# Patient Record
Sex: FEMALE | Race: BLACK OR AFRICAN AMERICAN | Employment: STUDENT | URBAN - METROPOLITAN AREA
[De-identification: names, ages, dates, MRNs, and addresses within clinical notes are randomized per-mention and may not be internally consistent; named-entity substitution may affect disease eponyms.]

---
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
## 2020-10-15 ENCOUNTER — ORDER TRANSCRIPTION (OUTPATIENT)
Dept: PHYSICAL THERAPY | Facility: HOSPITAL | Age: 20
End: 2020-10-15

## 2020-10-15 DIAGNOSIS — G11.8: Primary | ICD-10-CM

## 2020-10-29 ENCOUNTER — TELEPHONE (OUTPATIENT)
Dept: PHYSICAL THERAPY | Age: 20
End: 2020-10-29

## 2020-11-02 ENCOUNTER — OFFICE VISIT (OUTPATIENT)
Dept: PHYSICAL THERAPY | Facility: HOSPITAL | Age: 20
End: 2020-11-02
Attending: Other
Payer: COMMERCIAL

## 2020-11-02 DIAGNOSIS — G11.8: ICD-10-CM

## 2020-11-02 PROCEDURE — 97110 THERAPEUTIC EXERCISES: CPT

## 2020-11-02 PROCEDURE — 97163 PT EVAL HIGH COMPLEX 45 MIN: CPT

## 2020-11-03 NOTE — PROGRESS NOTES
NEUROLOGICAL EVALUATION:   Referring Physician: Dr. Helio Hopkins  Diagnosis: Spinocerebellar ataxia type 7 [facility])     Date of Service: 11/2/2020     PATIENT Juan Velez is a 21year old female who presents to therapy today with complaints of declinin thighs GUS.    Current functional limitations include limited walking with the assist of her mother or father (holds onto their forearms as they walk backwards from her), typically scoots on her bottom or crawls to negotiate within her home otherwise, donna 7. Pt and PT discussed evaluation findings, pathology, POC and HEP. Pt voiced understanding and performs HEP correctly without reported pain. Skilled Physical Therapy is medically necessary to address the above impairments and reach functional goals. for inc time and use of UEs, intermittent parent assist   Stand --> Sit MOD IND-Juan for inc time and use of UEs, intermittent parent assist   Chair --> Chair MOD IND-Juan for inc time and use of UEs, intermittent parent assist     Postural Control:  4-Sta PT POC. 3. Patient will improve 5x STS score to 30 sec or better to demonstrate functional LE strength gain. 4. Patient will improve TUG score to 45 sec or better with LRAD or least assist as able to improve household level ambulation and gait speed.

## 2020-11-04 ENCOUNTER — APPOINTMENT (OUTPATIENT)
Dept: PHYSICAL THERAPY | Facility: HOSPITAL | Age: 20
End: 2020-11-04
Attending: Other
Payer: COMMERCIAL

## 2020-11-09 ENCOUNTER — OFFICE VISIT (OUTPATIENT)
Dept: PHYSICAL THERAPY | Facility: HOSPITAL | Age: 20
End: 2020-11-09
Attending: Other
Payer: COMMERCIAL

## 2020-11-09 PROCEDURE — 97112 NEUROMUSCULAR REEDUCATION: CPT

## 2020-11-09 NOTE — PROGRESS NOTES
Dx: Spinocerebellar ataxia type 7 ([facility] 75.)         Insurance (Authorized # of Visits):  16 requested           Authorizing Physician: Dr. Chaka Feliciano  Next MD visit: none scheduled  Fall Risk: HIGH         Precautions: Fall Risk, visual impairment             [facility] [place] flexion x 10 R/L  - bridges with elbow assist x 10  - fwd/retro walking in // bars, BUEs x 3 laps   - side step x 2 laps, BUEs   - pulse lunge to airex x 10 R/L BUEs        -       -       -       HEP: seated trunk control without UE support, seated GUS hi

## 2020-11-16 ENCOUNTER — OFFICE VISIT (OUTPATIENT)
Dept: PHYSICAL THERAPY | Facility: HOSPITAL | Age: 20
End: 2020-11-16
Attending: Other
Payer: COMMERCIAL

## 2020-11-16 PROCEDURE — 97112 NEUROMUSCULAR REEDUCATION: CPT

## 2020-11-17 NOTE — PROGRESS NOTES
Dx: Spinocerebellar ataxia type 7 ([facility] 75.)         Insurance (Authorized # of Visits):  16 requested           Authorizing Physician: Dr. Pato Mcintosh  Next MD visit: none scheduled  Fall Risk: HIGH         Precautions: Fall Risk, visual impairment             [facility] [place] support, 2# BUE OH lift x 6 x 4  - seated without trunk support, reaching for moving targets x 8  - supine manual resisted LTR, small range x 8  - supine A/AA hip/knee flexion x 10 R/L  - bridges with elbow assist x 10  - fwd/retro walking in // bars, BUEs

## 2020-11-23 ENCOUNTER — OFFICE VISIT (OUTPATIENT)
Dept: PHYSICAL THERAPY | Facility: HOSPITAL | Age: 20
End: 2020-11-23
Attending: Other
Payer: COMMERCIAL

## 2020-11-23 PROCEDURE — 97112 NEUROMUSCULAR REEDUCATION: CPT

## 2020-11-24 NOTE — PROGRESS NOTES
Dx: Spinocerebellar ataxia type 7 ([facility] 75.)         Insurance (Authorized # of Visits):  16 requested           Authorizing Physician: Dr. Maria G Ibanez MD visit: none scheduled  Fall Risk: HIGH         Precautions: Fall Risk, visual impairment             [facility] [place] x 5 min  - seated without trunk support, 2# BUE OH lift x 6 x 4  - seated without trunk support, reaching for moving targets x 8  - supine manual resisted LTR, small range x 8  - supine A/AA hip/knee flexion x 10 R/L  - bridges with elbow assist x 10  - fw

## 2020-11-30 ENCOUNTER — OFFICE VISIT (OUTPATIENT)
Dept: PHYSICAL THERAPY | Facility: HOSPITAL | Age: 20
End: 2020-11-30
Attending: Other
Payer: COMMERCIAL

## 2020-11-30 PROCEDURE — 97112 NEUROMUSCULAR REEDUCATION: CPT

## 2020-12-01 NOTE — PROGRESS NOTES
Dx: Spinocerebellar ataxia type 7 ([facility] 75.)         Insurance (Authorized # of Visits):  16 requested           Authorizing Physician: Dr. Rosalind Dockery  Next MD visit: none scheduled  Fall Risk: HIGH         Precautions: Fall Risk, visual impairment             Ilana Trujillo Date: 11/9/2020  TX#: 2/16 Date: 11/16/2020            TX#: 3/16 Date: 11/23/2020            TX#: 4/16 Date: 11/30/2020           TX#: 5/16 Date:    Tx#: 6/   NR x 45 min  - NuStep L5 x 5 min  - seated without trunk support, 2# BUE OH lift x 6 x 4  - seat

## 2020-12-07 ENCOUNTER — APPOINTMENT (OUTPATIENT)
Dept: PHYSICAL THERAPY | Facility: HOSPITAL | Age: 20
End: 2020-12-07
Attending: Other
Payer: COMMERCIAL

## 2020-12-09 ENCOUNTER — OFFICE VISIT (OUTPATIENT)
Dept: PHYSICAL THERAPY | Facility: HOSPITAL | Age: 20
End: 2020-12-09
Attending: Other
Payer: COMMERCIAL

## 2020-12-09 PROCEDURE — 97112 NEUROMUSCULAR REEDUCATION: CPT

## 2020-12-09 NOTE — PROGRESS NOTES
Dx: Spinocerebellar ataxia type 7 ([facility] 75.)         Insurance (Authorized # of Visits):  16 requested           Authorizing Physician: Dr. Lynda Ibanez MD visit: none scheduled  Fall Risk: HIGH         Precautions: Fall Risk, visual impairment             [facility] [place] maintain progress made in PT. - issued and reviewed    Plan: Continue PT with progression as indicated. Progression standing postural control, fwd/retro/lateral steps to dots.    Date: 11/9/2020  TX#: 2/16 Date: 11/16/2020            TX#: 3/16 Date: 11/23/2 orange spri x 2 laps // bars  - squats to W/C, BUEs x 10 (LEs fatigued on rep 11)  - AP rockerboard, BUFlor x 20   - alt tap to dots on 6\" box x 15 R/L, BUEs   - fwd lunge to philip BUEs x 5 R/L  - DLS balance Juan, multiple bouts (max 60 sec Juan)   - - - -

## 2020-12-16 ENCOUNTER — OFFICE VISIT (OUTPATIENT)
Dept: PHYSICAL THERAPY | Facility: HOSPITAL | Age: 20
End: 2020-12-16
Attending: Other
Payer: COMMERCIAL

## 2020-12-16 PROCEDURE — 97112 NEUROMUSCULAR REEDUCATION: CPT

## 2020-12-17 NOTE — PROGRESS NOTES
Dx: Spinocerebellar ataxia type 7 ([facility] 75.)         Insurance (Authorized # of Visits):  16 requested           Authorizing Physician: Dr. Krystyna Fischer  Next MD visit: none scheduled  Fall Risk: HIGH         Precautions: Fall Risk, visual impairment             Araceli Luu gait speed. - progress  5. Patient will be IND and compliant in HEP to maintain progress made in PT. - issued and reviewed    Plan: Continue PT with progression as indicated. Progression standing postural control, fwd/retro/lateral steps to dots.    Date: 1 min  - NuStep L5 x 5 min  - fwd/retro walking with orange spri x 2 laps // bars  - side stepping orange spri x 2 laps // bars  - squats to W/C, BUEs x 10 (LEs fatigued on rep 11)  - AP rockerboard, Hu x 20   - alt tap to dots on 6\" box x 15 R/L, Hu

## 2020-12-21 ENCOUNTER — OFFICE VISIT (OUTPATIENT)
Dept: PHYSICAL THERAPY | Facility: HOSPITAL | Age: 20
End: 2020-12-21
Attending: Other
Payer: COMMERCIAL

## 2020-12-21 PROCEDURE — 97112 NEUROMUSCULAR REEDUCATION: CPT

## 2020-12-22 NOTE — PROGRESS NOTES
Dx: Spinocerebellar ataxia type 7 ([facility] 75.)         Insurance (Authorized # of Visits):  16 requested           Authorizing Physician: Dr. Yokasta Dao  Next MD visit: none scheduled  Fall Risk: HIGH         Precautions: Fall Risk, visual impairment             [place] assist as able to improve household level ambulation and gait speed. - progress  5. Patient will be IND and compliant in HEP to maintain progress made in PT. - issued and reviewed    Plan: Continue PT with progression as indicated.  Progression standing pos lunge to airex x 5 R/L  - DLS balance Juan, multiple bouts (max 60 sec Juan) NR x 45 min  - NuStep L5 x 5 min  - fwd/retro walking with orange spri x 2 laps // bars  - side stepping orange spri x 2 laps // bars  - squats to W/C, BUEs x 10 (LEs fatigued on

## 2020-12-28 PROBLEM — F43.29 ADJUSTMENT DISORDER WITH OTHER SYMPTOM: Status: ACTIVE | Noted: 2020-12-28

## 2020-12-30 ENCOUNTER — OFFICE VISIT (OUTPATIENT)
Dept: PHYSICAL THERAPY | Facility: HOSPITAL | Age: 20
End: 2020-12-30
Attending: Other
Payer: COMMERCIAL

## 2020-12-30 PROCEDURE — 97112 NEUROMUSCULAR REEDUCATION: CPT

## 2020-12-30 NOTE — PROGRESS NOTES
Dx: Spinocerebellar ataxia type 7 ([facility] 75.)         Insurance (Authorized # of Visits):  16 requested           Authorizing Physician: Dr. Arnaldo Berry  Next MD visit: none scheduled  Fall Risk: HIGH         Precautions: Fall Risk, visual impairment             [facility] [place] improve postural control by performing normal ELSY 30 sec or better. - progress  2. Patient will demonstrate ability to propel her lightweight W/C over level surfaces without assist if she is able to be fit for one during duration of PT POC.    3. Patient wi mahi x 10 R/L, BUEs  - AP rockerboard, BUEs x 20   - 6\" fwd step ups, BUEs x 10 R/L   - DLS balance Juan, multiple bouts    NR x 45 min  - NuStep L5 x 5 min  - fwd/retro walking in // bars, BUEs x 3 laps   - side stepping orange spri x 2 laps // bars  - - - - - -   HEP: seated trunk control without UE support, seated GUS hip abd with KAROLYN AYALA    Charges: NR x 3       Total Timed Treatment: 45 min  Total Treatment Time: 45 min

## 2021-01-06 ENCOUNTER — OFFICE VISIT (OUTPATIENT)
Dept: PHYSICAL THERAPY | Facility: HOSPITAL | Age: 21
End: 2021-01-06
Attending: Other
Payer: COMMERCIAL

## 2021-01-06 PROCEDURE — 97112 NEUROMUSCULAR REEDUCATION: CPT

## 2021-01-07 NOTE — PROGRESS NOTES
Progress Summary  Pt has attended 10 visits in Physical Therapy.      Dx: Spinocerebellar ataxia type 7 ([facility] 75.)         Insurance (Authorized # of Visits):  16 requested           Authorizing Physician: Dr. Kyle Calvin  Next MD visit: none scheduled  Fall Risk: HI in order to begin learning how to self-propel her WC when she receives a lightweight in the future, however, this process has not yet been started.  Patient with improving activity tolerance with ability to complete 45 min session with one seated rest break need for these services furnished under this plan of treatment and while under my care.     X___________________________________________________ Date____________________    Certification From: 3/5/6347  To:4/6/2021     Date: 11/16/2020            TX#: 3/16 min  - bosu fwd pulse lunges x 10 R/L, BUEs  - sand dune fwd step ups x 8 R/L, BUEs   - side stepping orange spri x 2 laps // bars  - squats to W/C, BUEs x 10 (LEs fatigued on rep 11)  - AP rockerboard, BUEs x 20   - DLS balance Juan, multiple bouts (max 6

## 2021-01-11 ENCOUNTER — APPOINTMENT (OUTPATIENT)
Dept: PHYSICAL THERAPY | Facility: HOSPITAL | Age: 21
End: 2021-01-11
Attending: Other
Payer: COMMERCIAL

## 2021-01-20 ENCOUNTER — APPOINTMENT (OUTPATIENT)
Dept: PHYSICAL THERAPY | Facility: HOSPITAL | Age: 21
End: 2021-01-20
Attending: Other
Payer: COMMERCIAL

## 2021-01-20 PROCEDURE — 97112 NEUROMUSCULAR REEDUCATION: CPT

## 2021-01-21 NOTE — PROGRESS NOTES
Dx: Spinocerebellar ataxia type 7 ([facility] 75.)         Insurance (Authorized # of Visits):  20 requested           Authorizing Physician: Dr. Lynda Ibanez MD visit: none scheduled  Fall Risk: HIGH         Precautions: Fall Risk, visual impairment             Connie Anderson demonstrate functional LE strength gain. - progress  4. Patient will improve TUG score to 45 sec or better with LRAD or least assist as able to improve household level ambulation and gait speed. - progress  5.  Patient will be IND and compliant in HEP to ma rockerboard, BUEs x 20   - DLS balance Juan, multiple bouts (max 60 sec Juan) NR x 45 min  - NuStep L6 x 5 min  - AP rockerboard x 20  - sand dune fwd step ups x 8 R/L, BUEs   - side stepping orange spri x 2 laps // bars  - fwd/retro walking with orange sp

## 2021-01-27 ENCOUNTER — OFFICE VISIT (OUTPATIENT)
Dept: PHYSICAL THERAPY | Facility: HOSPITAL | Age: 21
End: 2021-01-27
Attending: Other
Payer: COMMERCIAL

## 2021-01-27 PROCEDURE — 97112 NEUROMUSCULAR REEDUCATION: CPT

## 2021-01-28 NOTE — PROGRESS NOTES
Dx: Spinocerebellar ataxia type 7 ([facility] 75.)         Insurance (Authorized # of Visits):  20 requested           Authorizing Physician: Dr. Josr Peterson  Next MD visit: none scheduled  Fall Risk: HIGH         Precautions: Fall Risk, visual impairment             [facility] [place] better with LRAD or least assist as able to improve household level ambulation and gait speed. - progress  5.  Patient will be IND and compliant in HEP to maintain progress made in PT. - issued and reviewed      Plan: Continue PT with progression as indicat airex, unsuccessful) NR x 45 min  - NuStep L6 x 5 min  - STS with bar in front, BUEs x 12, focus on slow control with sit  - sand dune fwd step ups x 8 R/L, BUEs  - side stepping with 3# ankle wts x 2 laps, BUEs    - standing marching with 3# ankle wts, BU

## 2021-02-02 ENCOUNTER — OFFICE VISIT (OUTPATIENT)
Dept: PHYSICAL THERAPY | Facility: HOSPITAL | Age: 21
End: 2021-02-02
Attending: Other
Payer: COMMERCIAL

## 2021-02-02 PROCEDURE — 97112 NEUROMUSCULAR REEDUCATION: CPT

## 2021-02-02 NOTE — PROGRESS NOTES
Dx: Spinocerebellar ataxia type 7 ([facility] 75.)         Insurance (Authorized # of Visits):  20 requested           Authorizing Physician: Dr. Kyle Calvin  Next MD visit: none scheduled  Fall Risk: HIGH         Precautions: Fall Risk, visual impairment             [facility] [place] strength gain. - progress  4. Patient will improve TUG score to 45 sec or better with LRAD or least assist as able to improve household level ambulation and gait speed. - progress  5.  Patient will be IND and compliant in HEP to maintain progress made in PT with wide ELSY, 3# ankle wts, CGA-light Juan, 60 sec x 3  - fwd/retro walking with inc step length, BUEs  X 2 laps // bars  NR x 45 min  - NuStep L6 x 5 min  - goals reassessment as needed and listed   - yellow spri side step x 2 laps // bars  - yellow spri

## 2021-02-09 ENCOUNTER — APPOINTMENT (OUTPATIENT)
Dept: PHYSICAL THERAPY | Facility: HOSPITAL | Age: 21
End: 2021-02-09
Attending: Other
Payer: COMMERCIAL

## 2021-02-10 ENCOUNTER — APPOINTMENT (OUTPATIENT)
Dept: PHYSICAL THERAPY | Facility: HOSPITAL | Age: 21
End: 2021-02-10
Attending: Other
Payer: COMMERCIAL

## 2021-02-10 ENCOUNTER — TELEPHONE (OUTPATIENT)
Dept: PHYSICAL THERAPY | Facility: HOSPITAL | Age: 21
End: 2021-02-10

## 2021-02-16 ENCOUNTER — OFFICE VISIT (OUTPATIENT)
Dept: PHYSICAL THERAPY | Facility: HOSPITAL | Age: 21
End: 2021-02-16
Attending: Other
Payer: COMMERCIAL

## 2021-02-16 PROCEDURE — 97112 NEUROMUSCULAR REEDUCATION: CPT

## 2021-02-16 NOTE — PROGRESS NOTES
Dx: Spinocerebellar ataxia type 7 ([facility] 75.)         Insurance (Authorized # of Visits):  20 requested           Authorizing Physician: Dr. Lynda Ibanez MD visit: none scheduled  Fall Risk: HIGH         Precautions: Fall Risk, visual impairment             Connie Anderson improve 5x STS score to 30 sec or better to demonstrate functional LE strength gain. - progress  4. Patient will improve TUG score to 45 sec or better with LRAD or least assist as able to improve household level ambulation and gait speed. - progress  5.  Pa // bars  - attempted sand dune step ups, too hard; airex beam step ups x 15 R/L BUEs   - squats to chair, hold x 3 sec, BUEs x 12   - standing marching with 3# ankle wts, BUE assist x 12 R/L  - bosu fwd pulse lunges, BUEs x 10 R/L  - goals reassessment as [address]    Charges: NR x 3       Total Timed Treatment: 45 min  Total Treatment Time: 45 min

## 2021-02-23 ENCOUNTER — OFFICE VISIT (OUTPATIENT)
Dept: PHYSICAL THERAPY | Facility: HOSPITAL | Age: 21
End: 2021-02-23
Attending: Other
Payer: COMMERCIAL

## 2021-02-23 PROCEDURE — 97112 NEUROMUSCULAR REEDUCATION: CPT

## 2021-02-23 NOTE — PROGRESS NOTES
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 Whitney Carlos  Next MD visit: none scheduled  Fall Risk: HIGH         Precautions: Fall Risk, visual impairment             Keyonna Martins functional LE strength gain. - progress  4. Patient will improve TUG score to 45 sec or better with LRAD or least assist as able to improve household level ambulation and gait speed. - progress  5.  Patient will be IND and compliant in HEP to maintain progr - side stepping Y spri x 2 laps (fatigued)  - sand dune step ups, BUEs x 12 R/L   - bosu lunges x 10 R/L, BUEs   - squats to chair, hold x 3 sec, BUEs x 12   - DLS on level surface with wide ELSY, 3# ankle wts, CGA-light Juan, 60 sec x 3  - DLS on level r -   - - - - - - - -   HEP: seated trunk control without UE support, seated GUS hip abd with KAROLYN AYALA    Charges: NR x 3       Total Timed Treatment: 45 min  Total Treatment Time: 45 min

## 2021-03-01 ENCOUNTER — TELEPHONE (OUTPATIENT)
Dept: PHYSICAL THERAPY | Facility: HOSPITAL | Age: 21
End: 2021-03-01

## 2021-03-05 ENCOUNTER — OFFICE VISIT (OUTPATIENT)
Dept: PHYSICAL THERAPY | Facility: HOSPITAL | Age: 21
End: 2021-03-05
Attending: Other
Payer: COMMERCIAL

## 2021-03-05 PROCEDURE — 97112 NEUROMUSCULAR REEDUCATION: CPT

## 2021-03-05 NOTE — PROGRESS NOTES
Dx: Spinocerebellar ataxia type 7 ([facility] 75.)         Insurance (Authorized # of Visits):  20 requested           Authorizing Physician: Dr. Nancy Brooks  Next MD visit: none scheduled  Fall Risk: HIGH         Precautions: Fall Risk, visual impairment             [facility] [place] is able to be fit for one during duration of PT POC. - to begin formally addressing next session  3. Patient will improve 5x STS score to 30 sec or better to demonstrate functional LE strength gain. - progress  4.  Patient will improve TUG score to 45 sec o moving targets, 1UE x 10 R/L NR x 45 min  - NuStep L6 x 5 min  - fwd/retro walking in // bars, BUEs x 3 laps   - side stepping Y spri x 2 laps (fatigued)  - sand dune step ups, BUEs x 12 R/L   - bosu lunges x 10 R/L, BUEs   - squats to chair, hold x 3 sec, taps to targets x 12 R/L   - squats to chair with BUEs x 10, x 5  - sand dune step ups, BUEs x 12 R/L  - DLS balance, multiple attempts with min-modA   - - - - - - - -   - - - - - - - -   HEP: seated trunk control without UE support, seated GUS hip abd wit

## 2021-03-12 ENCOUNTER — OFFICE VISIT (OUTPATIENT)
Dept: PHYSICAL THERAPY | Facility: HOSPITAL | Age: 21
End: 2021-03-12
Attending: Other
Payer: COMMERCIAL

## 2021-03-12 PROCEDURE — 97112 NEUROMUSCULAR REEDUCATION: CPT

## 2021-03-12 NOTE — PROGRESS NOTES
Dx: Spinocerebellar ataxia type 7 ([facility] 75.)         Insurance (Authorized # of Visits):  20 requested           Authorizing Physician: Dr. Rod Gray  Next MD visit: none scheduled  Fall Risk: HIGH         Precautions: Fall Risk, visual impairment             [facility] [place] as needed throughout. Goals: (To be met in 20 visits)  1. Patient will improve postural control by performing normal ELSY 30 sec or better. - progress  2.  Patient will demonstrate ability to propel her lightweight W/C over level surfaces without dorcas (fatigued)  - sand dune step ups, BUEs x 12 R/L   - bosu lunges x 10 R/L, BUEs   - squats to chair, hold x 3 sec, BUEs x 12   - DLS on level surface with wide ELSY, 3# ankle wts, CGA-light Juan, 60 sec x 3  - DLS on level reaching for moving targets, 1UE x min-modA NR x 45 min  - NuStep L7 x 5 min  - fwd/retro walking in // bars with BUEs x 3 laps  - side stepping with Y spri in // bars, foucs on step length x 2 laps // bars  - fwd walking over hurdles (4), BUEs x 6  - sand dune step ups, BUEs x 12 R/L  - ma

## 2021-03-19 ENCOUNTER — OFFICE VISIT (OUTPATIENT)
Dept: PHYSICAL THERAPY | Facility: HOSPITAL | Age: 21
End: 2021-03-19
Attending: Other
Payer: COMMERCIAL

## 2021-03-19 PROCEDURE — 97112 NEUROMUSCULAR REEDUCATION: CPT

## 2021-03-19 NOTE — PROGRESS NOTES
Dx: Spinocerebellar ataxia type 7 ([facility] 75.)         Insurance (Authorized # of Visits):  20 requested           Authorizing Physician: Dr. Nancy Brooks  Next MD visit: none scheduled  Fall Risk: HIGH         Precautions: Fall Risk, visual impairment             [facility] [place] - progress  4. Patient will improve TUG score to 45 sec or better with LRAD or least assist as able to improve household level ambulation and gait speed. - progress  5.  Patient will be IND and compliant in HEP to maintain progress made in PT. - issued and seated EOM, no UE support tapping moving targets x ~20  - walking fwd/retro // bars, BUEs x 2 laps ea  - side step ups to 6\" box with CGA, BUEs x 8 R/L   - AP rockerboard x 25  - DLS wide ELSY, CGA-Juan 2 x 60 sec  NR x 45 min  - NuStep L7 x 5 min  - walki seated trunk control without UE support, seated GUS hip abd with KAROLYN AYALA    Charges: NR x 3       Total Timed Treatment: 45 min  Total Treatment Time: 45 min

## 2021-03-26 ENCOUNTER — OFFICE VISIT (OUTPATIENT)
Dept: PHYSICAL THERAPY | Facility: HOSPITAL | Age: 21
End: 2021-03-26
Attending: Other
Payer: COMMERCIAL

## 2021-03-26 PROCEDURE — 97112 NEUROMUSCULAR REEDUCATION: CPT

## 2021-03-26 NOTE — PROGRESS NOTES
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 Nancy Brooks  Next MD visit: none scheduled  Fall Risk: HIGH         Precautions: Fall Risk, visual impairment             [facility] [place] demonstrate functional LE strength gain. - progress  4. Patient will improve TUG score to 45 sec or better with LRAD or least assist as able to improve household level ambulation and gait speed. - progress  5.  Patient will be IND and compliant in HEP to ma 10 R/L, BUEs   - bosu fwd step ups x ~6 total min-modA   - seated without back support, attempt at keeping head in neutral, BUE press OH with 2# ball x 15  - seated without back support, Y theratube rows 2 x 15  - fwd walking over hurdles (3) with BUEs x 4 wide ELSY, attempting to reach for moving targets, difficult, multiple attempts   - - - - - - - -   - - - - - - - -   HEP: seated trunk control without UE support, seated GUS hip abd with KAROLYN AYALA    Charges: NR x 3       Total Timed Treatment: 45 min  Tot

## 2021-04-01 ENCOUNTER — APPOINTMENT (OUTPATIENT)
Dept: PHYSICAL THERAPY | Facility: HOSPITAL | Age: 21
End: 2021-04-01
Attending: Other
Payer: COMMERCIAL

## 2021-04-02 ENCOUNTER — APPOINTMENT (OUTPATIENT)
Dept: PHYSICAL THERAPY | Facility: HOSPITAL | Age: 21
End: 2021-04-02
Attending: Other
Payer: COMMERCIAL

## 2021-04-02 PROCEDURE — 97112 NEUROMUSCULAR REEDUCATION: CPT

## 2021-04-02 NOTE — PROGRESS NOTES
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 Baker ref.  provider found  Next MD visit: none scheduled  Fall Risk: HIGH         Precautions: Fall Risk, visual impairment addressing next session  3. Patient will improve 5x STS score to 30 sec or better to demonstrate functional LE strength gain. - progress  4.  Patient will improve TUG score to 45 sec or better with LRAD or least assist as able to improve household level amb walking in // bars with BUEs x 3 laps  - side stepping with Y spri in // bars, foucs on step length x 2 laps // bars  - seated without back support, attempt at keeping head in neutral, BUE press OH with 4# ball x 15  - marching with knee taps to targets x R/L   - squats to chair with BUEs 2 x 10  - DLS balance, wide ELSY, attempting to reach for moving targets, difficult, multiple attempts   - - - - - - - -   - - - - - - - -   HEP: seated trunk control without UE support, seated GUS hip abd with KAROLYN AYALA

## 2021-04-09 ENCOUNTER — OFFICE VISIT (OUTPATIENT)
Dept: PHYSICAL THERAPY | Facility: HOSPITAL | Age: 21
End: 2021-04-09
Attending: Other
Payer: COMMERCIAL

## 2021-04-09 PROCEDURE — 97112 NEUROMUSCULAR REEDUCATION: CPT

## 2021-04-09 NOTE — PROGRESS NOTES
Progress Summary  Pt has attended 20 visits in Physical Therapy.      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 Rina Ortega  Next MD visit: none scheduled  Fall Risk: HI rest breaks needed. She is more limited in the amount she is able to accomplish in session by duration of time needed to complete each movement/slowed speed due to limited postural control and increased inappropriate feedback.  She demonstrates functional s instruction             Patient/Family/Caregiver was advised of these findings, precautions, and treatment options and has agreed to actively participate in planning and for this course of care.     Thank you for your referral. If you have any questions, pl support, attempt at keeping head in neutral, BUE press OH with 4# ball x 15  - marching with knee taps to targets x 12 R/L   - squats to chair with BUEs x 10, x 5  - sand dune step ups, BUEs x 12 R/L  - DLS balance, multiple attempts with min-modA NR x 45 45 min  - NuStep L7 x 5 min  - fwd walking in // bars x 2 laps   - fwd walking over obstacle course in // bars including hurdles, airex, and 6\" box x 6 lengths  - standing alt toe tap to 6\" step with best speed and BUEs, large dot x 2 min, with 3# GUS an

## 2021-04-16 ENCOUNTER — OFFICE VISIT (OUTPATIENT)
Dept: PHYSICAL THERAPY | Facility: HOSPITAL | Age: 21
End: 2021-04-16
Attending: Other
Payer: COMMERCIAL

## 2021-04-16 PROCEDURE — 97112 NEUROMUSCULAR REEDUCATION: CPT

## 2021-04-16 NOTE — PROGRESS NOTES
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 Connolly He  Next MD visit: none scheduled  Fall Risk: HIGH         Precautions: Fall Risk, visual impairment             [facility] PT POC. - progress, patient does not yet have lightweight WC  3. Patient will improve 5x STS score to 30 sec or better to demonstrate functional LE strength gain. - MET  4.  Patient will improve TUG score to 45 sec or better with LRAD or least assist as abl dune step ups, BUEs x 12 R/L  - marching on airex to targets, BUEs x 10 R/L  - squats to chair with BUEs x 10, x 5  - DLS balance, multiple attempts with min-modA    NR x 45 min  - NuStep L7 x 5 min  - fwd/retro walking in // bars with BUEs x 3 laps  - raven difficult, multiple attempts NR x 45 min  - NuStep L7 x 5 min  - fwd walking in // bars with 2# ankle wts x 2 laps, BUEs  - side stepping in // bars with 2# ankle wts x 2 laps, BUEs  - standing alt toe tap to 6\" step with best speed, BUEs and 2# ankle wts

## 2021-04-23 ENCOUNTER — OFFICE VISIT (OUTPATIENT)
Dept: PHYSICAL THERAPY | Facility: HOSPITAL | Age: 21
End: 2021-04-23
Attending: Other
Payer: COMMERCIAL

## 2021-04-23 PROCEDURE — 97112 NEUROMUSCULAR REEDUCATION: CPT

## 2021-04-23 NOTE — PROGRESS NOTES
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 Arnaldo Berry  Next MD visit: none scheduled  Fall Risk: HIGH         Precautions: Fall Risk, visual impairment             [facility] [place] LRAD or least assist as able to improve household level ambulation and gait speed. - discharge this goal  5. Patient will demonstrate ability to sit without UE assist >60 sec with cervical neutral - new goal, progress  5.  Patient will be IND and compliant (3), BUEs x 3 laps   - standing alt toe tap to 6\" step with best speed and BUEs, large dot x 60 sec   - AP rockerboard, focus on posturing x ~20 (hard, but enjoyable)  - squats to chair with BUEs x 10, x 5  - DLS balance, wide ELSY, attempting to reach for walking over 6\" step, 8\" step, airex BUEs with 2# ankle wts x 2 lengths  - standing alt toe tap to 6\" step with best speed, BUEs and 2# ankle wts x 3 min  - squats to W/C with BUEs and 3 sec hold 2 x 10   - - - - - - - -   - - - - - - - -   HEP: seated

## 2021-04-30 ENCOUNTER — OFFICE VISIT (OUTPATIENT)
Dept: PHYSICAL THERAPY | Facility: HOSPITAL | Age: 21
End: 2021-04-30
Attending: Other
Payer: COMMERCIAL

## 2021-04-30 PROCEDURE — 97112 NEUROMUSCULAR REEDUCATION: CPT

## 2021-04-30 NOTE — PROGRESS NOTES
Discharge Summary  Pt has attended 23 visits in Physical Therapy.      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 Pato Mcintosh  Next MD visit: none scheduled  Fall Risk: H program with help from her family. Discussed home options for continued increased mobility. She was wondering about home // bars, given items to search online and she will do so with family.  Also spoke more regarding exercises that will assist with her nannette Patient/Family/Caregiver was advised of these findings, precautions, and treatment options and has agreed to actively participate in planning and for this course of care.     Thank you for your referral. If you have any questions, please contact me at D x 3 laps   - fwd walking over obstacle course in // bars including hurdles, airex, and 6\" box x 6 lengths  - standing alt toe tap to 6\" step with best speed and BUEs, large dot x 2 min, with 3# GUS ankle wts x 2 min  - standing alternating hip abd, 3# BI 10\") x 2 BUEs   - standing alt toe tap to 8\" step with best speed, BUEs and 4# ankle wts x 3 min  - standing lateral SLS toe tap, 4#, BUEs x 2 min R/L  - DLS balance widened ELSY with alt UE reaches 2 x 5 R/L, min-modA  - DLS balance widened ELSY with R/L

## 2021-07-07 PROBLEM — F43.9 STRESS: Status: ACTIVE | Noted: 2021-07-07

## 2022-12-29 ENCOUNTER — APPOINTMENT (OUTPATIENT)
Dept: GENERAL RADIOLOGY | Facility: HOSPITAL | Age: 22
End: 2022-12-29
Attending: UROLOGY
Payer: COMMERCIAL

## 2022-12-29 ENCOUNTER — ANESTHESIA (OUTPATIENT)
Dept: SURGERY | Facility: HOSPITAL | Age: 22
End: 2022-12-29
Payer: COMMERCIAL

## 2022-12-29 ENCOUNTER — HOSPITAL ENCOUNTER (INPATIENT)
Facility: HOSPITAL | Age: 22
LOS: 3 days | Discharge: HOME HEALTH | End: 2023-01-01
Attending: EMERGENCY MEDICINE | Admitting: INTERNAL MEDICINE
Payer: COMMERCIAL

## 2022-12-29 ENCOUNTER — APPOINTMENT (OUTPATIENT)
Dept: CT IMAGING | Age: 22
End: 2022-12-29
Attending: EMERGENCY MEDICINE
Payer: COMMERCIAL

## 2022-12-29 ENCOUNTER — ANESTHESIA EVENT (OUTPATIENT)
Dept: SURGERY | Facility: HOSPITAL | Age: 22
End: 2022-12-29
Payer: COMMERCIAL

## 2022-12-29 DIAGNOSIS — D72.829 LEUKOCYTOSIS, UNSPECIFIED TYPE: Primary | ICD-10-CM

## 2022-12-29 DIAGNOSIS — N13.30 HYDRONEPHROSIS, RIGHT: ICD-10-CM

## 2022-12-29 DIAGNOSIS — R00.0 SINUS TACHYCARDIA: ICD-10-CM

## 2022-12-29 LAB
ALBUMIN SERPL-MCNC: 4.3 G/DL (ref 3.4–5)
ALBUMIN/GLOB SERPL: 0.9 {RATIO} (ref 1–2)
ALP LIVER SERPL-CCNC: 71 U/L
ALT SERPL-CCNC: 22 U/L
ANION GAP SERPL CALC-SCNC: 6 MMOL/L (ref 0–18)
AST SERPL-CCNC: 22 U/L (ref 15–37)
B-HCG UR QL: NEGATIVE
BASOPHILS # BLD AUTO: 0.02 X10(3) UL (ref 0–0.2)
BASOPHILS NFR BLD AUTO: 0.1 %
BILIRUB SERPL-MCNC: 0.4 MG/DL (ref 0.1–2)
BILIRUB UR QL STRIP.AUTO: NEGATIVE
BUN BLD-MCNC: 11 MG/DL (ref 7–18)
CALCIUM BLD-MCNC: 10 MG/DL (ref 8.5–10.1)
CHLORIDE SERPL-SCNC: 102 MMOL/L (ref 98–112)
CO2 SERPL-SCNC: 27 MMOL/L (ref 21–32)
COLOR UR AUTO: YELLOW
CREAT BLD-MCNC: 0.8 MG/DL
EOSINOPHIL # BLD AUTO: 0.01 X10(3) UL (ref 0–0.7)
EOSINOPHIL NFR BLD AUTO: 0.1 %
ERYTHROCYTE [DISTWIDTH] IN BLOOD BY AUTOMATED COUNT: 11.9 %
GFR SERPLBLD BASED ON 1.73 SQ M-ARVRAT: 107 ML/MIN/1.73M2 (ref 60–?)
GLOBULIN PLAS-MCNC: 4.8 G/DL (ref 2.8–4.4)
GLUCOSE BLD-MCNC: 132 MG/DL (ref 70–99)
GLUCOSE UR STRIP.AUTO-MCNC: 500 MG/DL
HCG SERPL QL: NEGATIVE
HCT VFR BLD AUTO: 40.2 %
HGB BLD-MCNC: 14.3 G/DL
IMM GRANULOCYTES # BLD AUTO: 0.06 X10(3) UL (ref 0–1)
IMM GRANULOCYTES NFR BLD: 0.4 %
KETONES UR STRIP.AUTO-MCNC: NEGATIVE MG/DL
LACTATE SERPL-SCNC: 1 MMOL/L (ref 0.4–2)
LEUKOCYTE ESTERASE UR QL STRIP.AUTO: NEGATIVE
LIPASE SERPL-CCNC: 91 U/L (ref 73–393)
LYMPHOCYTES # BLD AUTO: 0.66 X10(3) UL (ref 1–4)
LYMPHOCYTES NFR BLD AUTO: 3.9 %
MCH RBC QN AUTO: 32.9 PG (ref 26–34)
MCHC RBC AUTO-ENTMCNC: 35.6 G/DL (ref 31–37)
MCV RBC AUTO: 92.4 FL
MONOCYTES # BLD AUTO: 0.57 X10(3) UL (ref 0.1–1)
MONOCYTES NFR BLD AUTO: 3.4 %
NEUTROPHILS # BLD AUTO: 15.54 X10 (3) UL (ref 1.5–7.7)
NEUTROPHILS # BLD AUTO: 15.54 X10(3) UL (ref 1.5–7.7)
NEUTROPHILS NFR BLD AUTO: 92.1 %
NITRITE UR QL STRIP.AUTO: NEGATIVE
OSMOLALITY SERPL CALC.SUM OF ELEC: 281 MOSM/KG (ref 275–295)
PH UR STRIP.AUTO: 7 [PH] (ref 5–8)
PLATELET # BLD AUTO: 291 10(3)UL (ref 150–450)
POCT INFLUENZA A: NEGATIVE
POCT INFLUENZA B: NEGATIVE
POTASSIUM SERPL-SCNC: 3.8 MMOL/L (ref 3.5–5.1)
PROT SERPL-MCNC: 9.1 G/DL (ref 6.4–8.2)
RBC # BLD AUTO: 4.35 X10(6)UL
RBC UR QL AUTO: NEGATIVE
SARS-COV-2 RNA RESP QL NAA+PROBE: NOT DETECTED
SODIUM SERPL-SCNC: 135 MMOL/L (ref 136–145)
SP GR UR STRIP.AUTO: 1.02 (ref 1–1.03)
UROBILINOGEN UR STRIP.AUTO-MCNC: 0.2 MG/DL
WBC # BLD AUTO: 16.9 X10(3) UL (ref 4–11)

## 2022-12-29 PROCEDURE — 80053 COMPREHEN METABOLIC PANEL: CPT | Performed by: EMERGENCY MEDICINE

## 2022-12-29 PROCEDURE — 0T768DZ DILATION OF RIGHT URETER WITH INTRALUMINAL DEVICE, VIA NATURAL OR ARTIFICIAL OPENING ENDOSCOPIC: ICD-10-PCS | Performed by: UROLOGY

## 2022-12-29 PROCEDURE — 96375 TX/PRO/DX INJ NEW DRUG ADDON: CPT

## 2022-12-29 PROCEDURE — 84703 CHORIONIC GONADOTROPIN ASSAY: CPT | Performed by: EMERGENCY MEDICINE

## 2022-12-29 PROCEDURE — 99285 EMERGENCY DEPT VISIT HI MDM: CPT

## 2022-12-29 PROCEDURE — 96361 HYDRATE IV INFUSION ADD-ON: CPT

## 2022-12-29 PROCEDURE — 85025 COMPLETE CBC W/AUTO DIFF WBC: CPT | Performed by: EMERGENCY MEDICINE

## 2022-12-29 PROCEDURE — 74177 CT ABD & PELVIS W/CONTRAST: CPT | Performed by: EMERGENCY MEDICINE

## 2022-12-29 PROCEDURE — 87086 URINE CULTURE/COLONY COUNT: CPT | Performed by: PHYSICIAN ASSISTANT

## 2022-12-29 PROCEDURE — 93005 ELECTROCARDIOGRAM TRACING: CPT

## 2022-12-29 PROCEDURE — 96374 THER/PROPH/DIAG INJ IV PUSH: CPT

## 2022-12-29 PROCEDURE — 81015 MICROSCOPIC EXAM OF URINE: CPT | Performed by: EMERGENCY MEDICINE

## 2022-12-29 PROCEDURE — 81001 URINALYSIS AUTO W/SCOPE: CPT | Performed by: EMERGENCY MEDICINE

## 2022-12-29 PROCEDURE — 93010 ELECTROCARDIOGRAM REPORT: CPT

## 2022-12-29 PROCEDURE — BT1DZZZ FLUOROSCOPY OF RIGHT KIDNEY, URETER AND BLADDER: ICD-10-PCS | Performed by: UROLOGY

## 2022-12-29 PROCEDURE — 87502 INFLUENZA DNA AMP PROBE: CPT | Performed by: EMERGENCY MEDICINE

## 2022-12-29 PROCEDURE — 83690 ASSAY OF LIPASE: CPT | Performed by: EMERGENCY MEDICINE

## 2022-12-29 PROCEDURE — 87040 BLOOD CULTURE FOR BACTERIA: CPT | Performed by: EMERGENCY MEDICINE

## 2022-12-29 PROCEDURE — 81025 URINE PREGNANCY TEST: CPT

## 2022-12-29 PROCEDURE — 83605 ASSAY OF LACTIC ACID: CPT | Performed by: EMERGENCY MEDICINE

## 2022-12-29 DEVICE — URETERAL STENT
Type: IMPLANTABLE DEVICE | Site: URETER | Status: FUNCTIONAL
Brand: ASCERTA™

## 2022-12-29 RX ORDER — SODIUM CHLORIDE, SODIUM LACTATE, POTASSIUM CHLORIDE, CALCIUM CHLORIDE 600; 310; 30; 20 MG/100ML; MG/100ML; MG/100ML; MG/100ML
INJECTION, SOLUTION INTRAVENOUS CONTINUOUS PRN
Status: DISCONTINUED | OUTPATIENT
Start: 2022-12-29 | End: 2022-12-29 | Stop reason: SURG

## 2022-12-29 RX ORDER — LIDOCAINE HYDROCHLORIDE 10 MG/ML
INJECTION, SOLUTION EPIDURAL; INFILTRATION; INTRACAUDAL; PERINEURAL AS NEEDED
Status: DISCONTINUED | OUTPATIENT
Start: 2022-12-29 | End: 2022-12-29 | Stop reason: SURG

## 2022-12-29 RX ORDER — HYDROMORPHONE HYDROCHLORIDE 1 MG/ML
0.4 INJECTION, SOLUTION INTRAMUSCULAR; INTRAVENOUS; SUBCUTANEOUS EVERY 5 MIN PRN
Status: DISCONTINUED | OUTPATIENT
Start: 2022-12-29 | End: 2022-12-29 | Stop reason: HOSPADM

## 2022-12-29 RX ORDER — ACETAMINOPHEN 500 MG
1000 TABLET ORAL ONCE
Status: DISCONTINUED | OUTPATIENT
Start: 2022-12-29 | End: 2023-01-01

## 2022-12-29 RX ORDER — ONDANSETRON 2 MG/ML
4 INJECTION INTRAMUSCULAR; INTRAVENOUS EVERY 6 HOURS PRN
Status: DISCONTINUED | OUTPATIENT
Start: 2022-12-29 | End: 2022-12-29 | Stop reason: HOSPADM

## 2022-12-29 RX ORDER — HYDROMORPHONE HYDROCHLORIDE 1 MG/ML
0.2 INJECTION, SOLUTION INTRAMUSCULAR; INTRAVENOUS; SUBCUTANEOUS EVERY 5 MIN PRN
Status: DISCONTINUED | OUTPATIENT
Start: 2022-12-29 | End: 2022-12-29 | Stop reason: HOSPADM

## 2022-12-29 RX ORDER — IOHEXOL 350 MG/ML
65 INJECTION, SOLUTION INTRAVENOUS
Status: COMPLETED | OUTPATIENT
Start: 2022-12-29 | End: 2022-12-29

## 2022-12-29 RX ORDER — HYDROMORPHONE HYDROCHLORIDE 1 MG/ML
0.6 INJECTION, SOLUTION INTRAMUSCULAR; INTRAVENOUS; SUBCUTANEOUS EVERY 5 MIN PRN
Status: DISCONTINUED | OUTPATIENT
Start: 2022-12-29 | End: 2022-12-29 | Stop reason: HOSPADM

## 2022-12-29 RX ORDER — ACETAMINOPHEN 500 MG
500 TABLET ORAL EVERY 4 HOURS PRN
Status: DISCONTINUED | OUTPATIENT
Start: 2022-12-29 | End: 2023-01-01

## 2022-12-29 RX ORDER — ONDANSETRON 2 MG/ML
4 INJECTION INTRAMUSCULAR; INTRAVENOUS ONCE
Status: COMPLETED | OUTPATIENT
Start: 2022-12-29 | End: 2022-12-29

## 2022-12-29 RX ORDER — KETOROLAC TROMETHAMINE 30 MG/ML
INJECTION, SOLUTION INTRAMUSCULAR; INTRAVENOUS AS NEEDED
Status: DISCONTINUED | OUTPATIENT
Start: 2022-12-29 | End: 2022-12-29 | Stop reason: SURG

## 2022-12-29 RX ORDER — MIDAZOLAM HYDROCHLORIDE 1 MG/ML
INJECTION INTRAMUSCULAR; INTRAVENOUS AS NEEDED
Status: DISCONTINUED | OUTPATIENT
Start: 2022-12-29 | End: 2022-12-29 | Stop reason: SURG

## 2022-12-29 RX ORDER — NALOXONE HYDROCHLORIDE 0.4 MG/ML
80 INJECTION, SOLUTION INTRAMUSCULAR; INTRAVENOUS; SUBCUTANEOUS AS NEEDED
Status: DISCONTINUED | OUTPATIENT
Start: 2022-12-29 | End: 2022-12-29 | Stop reason: HOSPADM

## 2022-12-29 RX ORDER — ACETAMINOPHEN 500 MG
1000 TABLET ORAL ONCE
Status: DISCONTINUED | OUTPATIENT
Start: 2022-12-29 | End: 2022-12-29

## 2022-12-29 RX ORDER — SODIUM CHLORIDE 9 MG/ML
INJECTION, SOLUTION INTRAVENOUS CONTINUOUS
Status: DISCONTINUED | OUTPATIENT
Start: 2022-12-30 | End: 2022-12-30

## 2022-12-29 RX ORDER — CEFAZOLIN SODIUM 1 G/3ML
INJECTION, POWDER, FOR SOLUTION INTRAMUSCULAR; INTRAVENOUS AS NEEDED
Status: DISCONTINUED | OUTPATIENT
Start: 2022-12-29 | End: 2022-12-29 | Stop reason: SURG

## 2022-12-29 RX ORDER — DEXAMETHASONE SODIUM PHOSPHATE 4 MG/ML
VIAL (ML) INJECTION AS NEEDED
Status: DISCONTINUED | OUTPATIENT
Start: 2022-12-29 | End: 2022-12-29 | Stop reason: SURG

## 2022-12-29 RX ORDER — PROCHLORPERAZINE EDISYLATE 5 MG/ML
5 INJECTION INTRAMUSCULAR; INTRAVENOUS EVERY 8 HOURS PRN
Status: DISCONTINUED | OUTPATIENT
Start: 2022-12-29 | End: 2023-01-01

## 2022-12-29 RX ORDER — ONDANSETRON 2 MG/ML
INJECTION INTRAMUSCULAR; INTRAVENOUS
Status: COMPLETED
Start: 2022-12-29 | End: 2022-12-29

## 2022-12-29 RX ORDER — SODIUM CHLORIDE, SODIUM LACTATE, POTASSIUM CHLORIDE, CALCIUM CHLORIDE 600; 310; 30; 20 MG/100ML; MG/100ML; MG/100ML; MG/100ML
INJECTION, SOLUTION INTRAVENOUS CONTINUOUS
Status: DISCONTINUED | OUTPATIENT
Start: 2022-12-29 | End: 2022-12-29 | Stop reason: HOSPADM

## 2022-12-29 RX ORDER — ONDANSETRON 2 MG/ML
INJECTION INTRAMUSCULAR; INTRAVENOUS AS NEEDED
Status: DISCONTINUED | OUTPATIENT
Start: 2022-12-29 | End: 2022-12-29 | Stop reason: SURG

## 2022-12-29 RX ORDER — KETOROLAC TROMETHAMINE 15 MG/ML
15 INJECTION, SOLUTION INTRAMUSCULAR; INTRAVENOUS ONCE
Status: COMPLETED | OUTPATIENT
Start: 2022-12-29 | End: 2022-12-29

## 2022-12-29 RX ORDER — ACETAMINOPHEN 650 MG/1
650 SUPPOSITORY RECTAL ONCE
Status: DISCONTINUED | OUTPATIENT
Start: 2022-12-29 | End: 2022-12-29

## 2022-12-29 RX ORDER — ONDANSETRON 4 MG/1
4 TABLET, ORALLY DISINTEGRATING ORAL ONCE
Status: DISCONTINUED | OUTPATIENT
Start: 2022-12-29 | End: 2022-12-29

## 2022-12-29 RX ORDER — ONDANSETRON 2 MG/ML
4 INJECTION INTRAMUSCULAR; INTRAVENOUS EVERY 6 HOURS PRN
Status: DISCONTINUED | OUTPATIENT
Start: 2022-12-29 | End: 2023-01-01

## 2022-12-29 RX ADMIN — SODIUM CHLORIDE, SODIUM LACTATE, POTASSIUM CHLORIDE, CALCIUM CHLORIDE: 600; 310; 30; 20 INJECTION, SOLUTION INTRAVENOUS at 21:46:00

## 2022-12-29 RX ADMIN — DEXAMETHASONE SODIUM PHOSPHATE 4 MG: 4 MG/ML VIAL (ML) INJECTION at 21:55:00

## 2022-12-29 RX ADMIN — KETOROLAC TROMETHAMINE 15 MG: 30 INJECTION, SOLUTION INTRAMUSCULAR; INTRAVENOUS at 22:19:00

## 2022-12-29 RX ADMIN — CEFAZOLIN SODIUM 2 G: 1 INJECTION, POWDER, FOR SOLUTION INTRAMUSCULAR; INTRAVENOUS at 22:00:00

## 2022-12-29 RX ADMIN — MIDAZOLAM HYDROCHLORIDE 1 MG: 1 INJECTION INTRAMUSCULAR; INTRAVENOUS at 21:46:00

## 2022-12-29 RX ADMIN — LIDOCAINE HYDROCHLORIDE 50 MG: 10 INJECTION, SOLUTION EPIDURAL; INFILTRATION; INTRACAUDAL; PERINEURAL at 21:46:00

## 2022-12-29 RX ADMIN — ONDANSETRON 4 MG: 2 INJECTION INTRAMUSCULAR; INTRAVENOUS at 21:55:00

## 2022-12-30 LAB
ANION GAP SERPL CALC-SCNC: 6 MMOL/L (ref 0–18)
ATRIAL RATE: 136 BPM
BASOPHILS # BLD AUTO: 0.01 X10(3) UL (ref 0–0.2)
BASOPHILS NFR BLD AUTO: 0.1 %
BUN BLD-MCNC: 7 MG/DL (ref 7–18)
CALCIUM BLD-MCNC: 9.4 MG/DL (ref 8.5–10.1)
CHLORIDE SERPL-SCNC: 109 MMOL/L (ref 98–112)
CO2 SERPL-SCNC: 24 MMOL/L (ref 21–32)
CREAT BLD-MCNC: 0.76 MG/DL
EOSINOPHIL # BLD AUTO: 0 X10(3) UL (ref 0–0.7)
EOSINOPHIL NFR BLD AUTO: 0 %
ERYTHROCYTE [DISTWIDTH] IN BLOOD BY AUTOMATED COUNT: 11.9 %
GFR SERPLBLD BASED ON 1.73 SQ M-ARVRAT: 114 ML/MIN/1.73M2 (ref 60–?)
GLUCOSE BLD-MCNC: 115 MG/DL (ref 70–99)
HCT VFR BLD AUTO: 36.9 %
HGB BLD-MCNC: 13.2 G/DL
IMM GRANULOCYTES # BLD AUTO: 0.07 X10(3) UL (ref 0–1)
IMM GRANULOCYTES NFR BLD: 0.4 %
LACTATE SERPL-SCNC: 0.8 MMOL/L (ref 0.4–2)
LACTATE SERPL-SCNC: 1.2 MMOL/L (ref 0.4–2)
LYMPHOCYTES # BLD AUTO: 0.74 X10(3) UL (ref 1–4)
LYMPHOCYTES NFR BLD AUTO: 4.7 %
MCH RBC QN AUTO: 33.8 PG (ref 26–34)
MCHC RBC AUTO-ENTMCNC: 35.8 G/DL (ref 31–37)
MCV RBC AUTO: 94.4 FL
MONOCYTES # BLD AUTO: 0.56 X10(3) UL (ref 0.1–1)
MONOCYTES NFR BLD AUTO: 3.6 %
NEUTROPHILS # BLD AUTO: 14.22 X10 (3) UL (ref 1.5–7.7)
NEUTROPHILS # BLD AUTO: 14.22 X10(3) UL (ref 1.5–7.7)
NEUTROPHILS NFR BLD AUTO: 91.2 %
OSMOLALITY SERPL CALC.SUM OF ELEC: 287 MOSM/KG (ref 275–295)
P AXIS: 74 DEGREES
P-R INTERVAL: 150 MS
PLATELET # BLD AUTO: 275 10(3)UL (ref 150–450)
POTASSIUM SERPL-SCNC: 3.6 MMOL/L (ref 3.5–5.1)
PROCALCITONIN SERPL-MCNC: <0.05 NG/ML (ref ?–0.16)
Q-T INTERVAL: 274 MS
QRS DURATION: 66 MS
QTC CALCULATION (BEZET): 412 MS
R AXIS: 74 DEGREES
RBC # BLD AUTO: 3.91 X10(6)UL
SODIUM SERPL-SCNC: 139 MMOL/L (ref 136–145)
T AXIS: 72 DEGREES
VENTRICULAR RATE: 136 BPM
WBC # BLD AUTO: 15.6 X10(3) UL (ref 4–11)

## 2022-12-30 PROCEDURE — 92526 ORAL FUNCTION THERAPY: CPT

## 2022-12-30 PROCEDURE — 85025 COMPLETE CBC W/AUTO DIFF WBC: CPT | Performed by: INTERNAL MEDICINE

## 2022-12-30 PROCEDURE — 93010 ELECTROCARDIOGRAM REPORT: CPT | Performed by: INTERNAL MEDICINE

## 2022-12-30 PROCEDURE — 84145 PROCALCITONIN (PCT): CPT | Performed by: HOSPITALIST

## 2022-12-30 PROCEDURE — 80048 BASIC METABOLIC PNL TOTAL CA: CPT | Performed by: INTERNAL MEDICINE

## 2022-12-30 PROCEDURE — 83605 ASSAY OF LACTIC ACID: CPT | Performed by: HOSPITALIST

## 2022-12-30 PROCEDURE — 92610 EVALUATE SWALLOWING FUNCTION: CPT

## 2022-12-30 PROCEDURE — 93005 ELECTROCARDIOGRAM TRACING: CPT

## 2022-12-30 RX ORDER — HEPARIN SODIUM 5000 [USP'U]/ML
5000 INJECTION, SOLUTION INTRAVENOUS; SUBCUTANEOUS EVERY 8 HOURS SCHEDULED
Status: DISCONTINUED | OUTPATIENT
Start: 2022-12-30 | End: 2023-01-01

## 2022-12-30 RX ORDER — ACETAMINOPHEN 500 MG
500 TABLET ORAL EVERY 6 HOURS PRN
Status: DISCONTINUED | OUTPATIENT
Start: 2022-12-30 | End: 2023-01-01

## 2022-12-30 RX ORDER — SENNA AND DOCUSATE SODIUM 50; 8.6 MG/1; MG/1
1 TABLET, FILM COATED ORAL 2 TIMES DAILY
Status: DISCONTINUED | OUTPATIENT
Start: 2022-12-30 | End: 2023-01-01

## 2022-12-30 RX ORDER — POLYETHYLENE GLYCOL 3350 17 G/17G
17 POWDER, FOR SOLUTION ORAL DAILY PRN
Status: DISCONTINUED | OUTPATIENT
Start: 2022-12-30 | End: 2023-01-01

## 2022-12-30 RX ORDER — SODIUM CHLORIDE 9 MG/ML
INJECTION, SOLUTION INTRAVENOUS CONTINUOUS
Status: DISCONTINUED | OUTPATIENT
Start: 2022-12-30 | End: 2022-12-31

## 2022-12-30 RX ORDER — POLYETHYLENE GLYCOL 3350 17 G/17G
17 POWDER, FOR SOLUTION ORAL ONCE
Status: COMPLETED | OUTPATIENT
Start: 2022-12-30 | End: 2022-12-30

## 2022-12-30 RX ORDER — SODIUM CHLORIDE 9 MG/ML
INJECTION, SOLUTION INTRAVENOUS CONTINUOUS
Status: DISCONTINUED | OUTPATIENT
Start: 2022-12-30 | End: 2022-12-30

## 2022-12-30 RX ORDER — BISACODYL 10 MG
10 SUPPOSITORY, RECTAL RECTAL
Status: DISCONTINUED | OUTPATIENT
Start: 2022-12-30 | End: 2023-01-01

## 2022-12-30 NOTE — ANESTHESIA PROCEDURE NOTES
Airway  Date/Time: 12/29/2022 9:52 PM  Urgency: elective      General Information and Staff    Patient location during procedure: OR  Anesthesiologist: Rafal Munoz MD  Performed: anesthesiologist     Indications and Patient Condition  Indications for airway management: anesthesia  Spontaneous Ventilation: absent  Sedation level: deep  Preoxygenated: yes  Patient position: sniffing  Mask difficulty assessment: 1 - vent by mask    Final Airway Details  Final airway type: endotracheal airway      Successful airway: ETT  Cuffed: yes   Successful intubation technique: Video laryngoscopy  Endotracheal tube insertion site: oral  Blade: GlideScope  Blade size: #3  ETT size (mm): 6.5    Cormack-Lehane Classification: grade I - full view of glottis  Placement verified by: chest auscultation and capnometry   Cuff volume (mL): 5  Measured from: lips  Number of attempts at approach: 1    Additional Comments  VC clean, passed smoothly, atraumatically. OGT and soft bite block to molars.  Dentition unchanged

## 2022-12-30 NOTE — OPERATIVE REPORT
[facility]    PATIENT'S NAME: Sarah Yuen   ATTENDING PHYSICIAN: Meliza Escobar M.D. OPERATING PHYSICIAN: Meliza Escobar M.D. PATIENT ACCOUNT#:   [de-identified]    LOCATION:  [address] [place] [address]  MEDICAL RECORD #:   [id number]       YOB: 2000  ADMISSION DATE:       12/29/2022      OPERATION DATE:  12/29/2022    OPERATIVE REPORT      PREOPERATIVE DIAGNOSIS:  Right hydronephrosis with fever. POSTOPERATIVE DIAGNOSIS:  Right hydronephrosis, fever. PROCEDURE:  Cystoscopy, right retrograde pyelogram, right stent placement. ANESTHESIA:  General.    COMPLICATIONS:  None. ESTIMATED BLOOD LOSS:  Zero. SPECIMENS:  None. INDICATIONS:  This is a 19-year-old female who presents with a right hydronephrosis and fever after presenting to the emergency room with abdominal pain and vomiting. The patient underwent a CT showing possible right hydronephrosis. Urinalysis is consistent with UTI. The patient presents now for stent placement. I discussed with her parents the risks, including bleeding, infection, ureteral injury, renal injury, as well as risks of anesthesia, including death. The patient and her parents understand this and wish to proceed. OPERATIVE TECHNIQUE:  The patient was placed in dorsal lithotomy position, prepped and draped in sterile fashion. A 22-Bengali sheath was passed through the urethra with the obturator in place. There was a contrast-filled bladder. The bladder emptied. There was persistent contrast seen in bilateral renal pelvises. Next, a right retrograde pyelogram was performed demonstrating normal course of the ureter with narrowing near the blood vessels. Next, a Glidewire was passed up through the ureter under fluoroscopic guidance up into bifid pelvis. While there was some mild hydronephrosis, it was very mild and the renal pelvis was not very dilated to allow coiling of the stent, but I elected to place a stent anyway.   After the wire was placed, the scope was removed and revealed the stent was passed over the wire. The wire was removed and a general curve was seen in the upper pole calyx and the upper pelvis. The stent was removed showing a coil in the bladder. The patient's bladder was emptied. She was taken to the recovery room having tolerated the procedure well.     Dictated By Lexi Bautista M.D.  d: 12/29/2022 22:51:52  t: 12/30/2022 05:44:58  Bita Castellon [id number]  FF/

## 2022-12-30 NOTE — PLAN OF CARE
Assumed pt care at [phone], received pt from PACU, right cystoscopy stent insertion and right retrograde pyelogram. A&O x4. Tele rhythm Sinus tachycardia SPO2 97% on room air. Breath sounds clear bilaterally. Pt is aspiration precaution, pt chokes and coughs while eating and/or drinking per parents. SLP consult placed, pt on strict NPO. Pt voiding with no issues, purewick and brief in place d/t pt being drowsy from anesthesia and weak. No c/o chest pain or shortness of breath. Skin dry and intact. Bed is locked and in low position. Call light and personal items within reach. Reviewed plan of care and patient verbalizes understanding. Parents at bedside.   0.9% NS @ 100ml/hr  Rocephin q24      Problem: Patient/Family Goals  Goal: Patient/Family Long Term Goal  Description: Patient's Long Term Goal:     Interventions:  - See additional Care Plan goals for specific interventions  12/30/2022 0455 by Carson Jerez RN  Outcome: Progressing  12/30/2022 0454 by Carson Jerez RN  Outcome: Progressing  Goal: Patient/Family Short Term Goal  Description: Patient's Short Term Goal:     Interventions:   - See additional Care Plan goals for specific interventions  12/30/2022 0455 by Carson Jerez RN  Outcome: Progressing  12/30/2022 0454 by Carson Jerez RN  Outcome: Progressing     Problem: PAIN - ADULT  Goal: Verbalizes/displays adequate comfort level or patient's stated pain goal  Description: INTERVENTIONS:  - Encourage pt to monitor pain and request assistance  - Assess pain using appropriate pain scale  - Administer analgesics based on type and severity of pain and evaluate response  - Implement non-pharmacological measures as appropriate and evaluate response  - Consider cultural and social influences on pain and pain management  - Manage/alleviate anxiety  - Utilize distraction and/or relaxation techniques  - Monitor for opioid side effects  - Notify MD/LIP if interventions unsuccessful or patient reports new pain  - Anticipate increased pain with activity and pre-medicate as appropriate  12/30/2022 0455 by Gila Youssef RN  Outcome: Progressing  12/30/2022 0454 by Gila Youssef RN  Outcome: Progressing

## 2022-12-30 NOTE — BRIEF OP NOTE
Pre-Operative Diagnosis: Hydronephrosis with fever     Post-Operative Diagnosis:   Hydronephrosis with fever     Procedure Performed:   CYSTOSCOPY STENT INSERTION RIGHT, RIGHT RETROGRADE PYELOGRAM    Surgeon(s) and Role:     Robert Stack MD - Primary    Assistant(s):        Surgical Findings: narrowing of right mid ureter     Specimen: none     Estimated Blood Loss: No data recorded        Sue Perez MD  12/29/2022  10:52 PM

## 2022-12-30 NOTE — PLAN OF CARE
Pt AOx4. O2 sats maintained on RA, denies SOB. ST on tele, MD notified. Bolus ordered and IVF infusing as ordered. Pt denies pain. Pt voiding, urine pink. Tmax 99.8. Urine culture pending. PVR completed: 48 ml. Pt and family updated on plan of care. 1700: Dr Sydnee Barnett notified about consistent HR 110s-120s. Orders received for another 1 L bolus over 2 hours with some relief.      Problem: PAIN - ADULT  Goal: Verbalizes/displays adequate comfort level or patient's stated pain goal  Description: INTERVENTIONS:  - Encourage pt to monitor pain and request assistance  - Assess pain using appropriate pain scale  - Administer analgesics based on type and severity of pain and evaluate response  - Implement non-pharmacological measures as appropriate and evaluate response  - Consider cultural and social influences on pain and pain management  - Manage/alleviate anxiety  - Utilize distraction and/or relaxation techniques  - Monitor for opioid side effects  - Notify MD/LIP if interventions unsuccessful or patient reports new pain  - Anticipate increased pain with activity and pre-medicate as appropriate  Outcome: Progressing

## 2022-12-31 LAB
ANION GAP SERPL CALC-SCNC: 2 MMOL/L (ref 0–18)
ATRIAL RATE: 112 BPM
BASOPHILS # BLD AUTO: 0.05 X10(3) UL (ref 0–0.2)
BASOPHILS NFR BLD AUTO: 0.4 %
BUN BLD-MCNC: 8 MG/DL (ref 7–18)
CALCIUM BLD-MCNC: 9.4 MG/DL (ref 8.5–10.1)
CHLORIDE SERPL-SCNC: 108 MMOL/L (ref 98–112)
CO2 SERPL-SCNC: 29 MMOL/L (ref 21–32)
CREAT BLD-MCNC: 0.76 MG/DL
EOSINOPHIL # BLD AUTO: 0.04 X10(3) UL (ref 0–0.7)
EOSINOPHIL NFR BLD AUTO: 0.3 %
ERYTHROCYTE [DISTWIDTH] IN BLOOD BY AUTOMATED COUNT: 11.9 %
GFR SERPLBLD BASED ON 1.73 SQ M-ARVRAT: 114 ML/MIN/1.73M2 (ref 60–?)
GLUCOSE BLD-MCNC: 86 MG/DL (ref 70–99)
HCT VFR BLD AUTO: 40.3 %
HGB BLD-MCNC: 13.8 G/DL
IMM GRANULOCYTES # BLD AUTO: 0.03 X10(3) UL (ref 0–1)
IMM GRANULOCYTES NFR BLD: 0.3 %
LYMPHOCYTES # BLD AUTO: 2.65 X10(3) UL (ref 1–4)
LYMPHOCYTES NFR BLD AUTO: 22.5 %
MCH RBC QN AUTO: 32.9 PG (ref 26–34)
MCHC RBC AUTO-ENTMCNC: 34.2 G/DL (ref 31–37)
MCV RBC AUTO: 96.2 FL
MONOCYTES # BLD AUTO: 1.03 X10(3) UL (ref 0.1–1)
MONOCYTES NFR BLD AUTO: 8.8 %
NEUTROPHILS # BLD AUTO: 7.97 X10 (3) UL (ref 1.5–7.7)
NEUTROPHILS # BLD AUTO: 7.97 X10(3) UL (ref 1.5–7.7)
NEUTROPHILS NFR BLD AUTO: 67.7 %
OSMOLALITY SERPL CALC.SUM OF ELEC: 286 MOSM/KG (ref 275–295)
P AXIS: 83 DEGREES
P-R INTERVAL: 144 MS
PLATELET # BLD AUTO: 273 10(3)UL (ref 150–450)
POTASSIUM SERPL-SCNC: 3.2 MMOL/L (ref 3.5–5.1)
Q-T INTERVAL: 328 MS
QRS DURATION: 78 MS
QTC CALCULATION (BEZET): 447 MS
R AXIS: 84 DEGREES
RBC # BLD AUTO: 4.19 X10(6)UL
SODIUM SERPL-SCNC: 139 MMOL/L (ref 136–145)
T AXIS: 70 DEGREES
VENTRICULAR RATE: 112 BPM
WBC # BLD AUTO: 11.8 X10(3) UL (ref 4–11)

## 2022-12-31 PROCEDURE — 97161 PT EVAL LOW COMPLEX 20 MIN: CPT

## 2022-12-31 PROCEDURE — 97530 THERAPEUTIC ACTIVITIES: CPT

## 2022-12-31 PROCEDURE — 85025 COMPLETE CBC W/AUTO DIFF WBC: CPT | Performed by: HOSPITALIST

## 2022-12-31 PROCEDURE — 80048 BASIC METABOLIC PNL TOTAL CA: CPT | Performed by: HOSPITALIST

## 2022-12-31 RX ORDER — POTASSIUM CHLORIDE 20 MEQ/1
40 TABLET, EXTENDED RELEASE ORAL ONCE
Status: COMPLETED | OUTPATIENT
Start: 2022-12-31 | End: 2022-12-31

## 2022-12-31 RX ORDER — NORETHINDRONE ACETATE AND ETHINYL ESTRADIOL 1; .02 MG/1; MG/1
1 TABLET ORAL DAILY
Status: DISCONTINUED | OUTPATIENT
Start: 2022-12-31 | End: 2023-01-01

## 2022-12-31 NOTE — PLAN OF CARE
Received patient at 0730. Alert and Oriented. Tele Rhythm ST.  O2 saturation 97% On room air. Breath sounds clear. Bed is locked and in low position. Call light and personal items within reach. No C/O chest pain or shortness of breath. Pt incontinent, purewick used to monitor urine output. Is blood tinged today but clear with no clots. Per Urology note hematuria expected after stent, will monitor for clots. . Pt up with pivot transfer to chair/commode. BM this am after RS. Pt on IV abx, IVfluid DC this am. Reviewed plan of care and patient and mom verbalize understanding. Problem: Patient/Family Goals  Goal: Patient/Family Long Term Goal  Description: Patient's Long Term Goal: to go home    Interventions:  - IVF, abx, tele monitoring, monitor urine output. - See additional Care Plan goals for specific interventions  Outcome: Progressing  Goal: Patient/Family Short Term Goal  Description: Patient's Short Term Goal: feel better    Interventions:   - - IVF, abx, tele monitoring, monitor urine output.     - See additional Care Plan goals for specific interventions  Outcome: Progressing     Problem: PAIN - ADULT  Goal: Verbalizes/displays adequate comfort level or patient's stated pain goal  Description: INTERVENTIONS:  - Encourage pt to monitor pain and request assistance  - Assess pain using appropriate pain scale  - Administer analgesics based on type and severity of pain and evaluate response  - Implement non-pharmacological measures as appropriate and evaluate response  - Consider cultural and social influences on pain and pain management  - Manage/alleviate anxiety  - Utilize distraction and/or relaxation techniques  - Monitor for opioid side effects  - Notify MD/LIP if interventions unsuccessful or patient reports new pain  - Anticipate increased pain with activity and pre-medicate as appropriate  Outcome: Progressing     Problem: CARDIOVASCULAR - ADULT  Goal: Maintains optimal cardiac output and hemodynamic stability  Description: INTERVENTIONS:  - Monitor vital signs, rhythm, and trends  - Monitor for bleeding, hypotension and signs of decreased cardiac output  - Evaluate effectiveness of vasoactive medications to optimize hemodynamic stability  - Monitor arterial and/or venous puncture sites for bleeding and/or hematoma  - Assess quality of pulses, skin color and temperature  - Assess for signs of decreased coronary artery perfusion - ex.  Angina  - Evaluate fluid balance, assess for edema, trend weights  Outcome: Progressing  Goal: Absence of cardiac arrhythmias or at baseline  Description: INTERVENTIONS:  - Continuous cardiac monitoring, monitor vital signs, obtain 12 lead EKG if indicated  - Evaluate effectiveness of antiarrhythmic and heart rate control medications as ordered  - Initiate emergency measures for life threatening arrhythmias  - Monitor electrolytes and administer replacement therapy as ordered  Outcome: Progressing

## 2022-12-31 NOTE — PLAN OF CARE
Assumed care of pt at [facility] x4, R/A, up x1 to 2 assist- ataxia  ST, no cardiac symptoms  Pt denies any pain  Continent of bowel, last BM 12/29, senokot given, plan to give suppository in AM per MD  Periods of incontinence of urine, purewick and brief, urine is pink tinged  IV rocephin q 24  Fall precautions in place  Pt and family updated on plan of care, all needs met at this time    *around 2200 pt HR sustaining high 110s-120s/ BP increased, notified MD  -0.9 rate decreased to 100 ml/hr continuous  -Repeat EKG- Sinus tach          Problem: PAIN - ADULT  Goal: Verbalizes/displays adequate comfort level or patient's stated pain goal  Description: INTERVENTIONS:  - Encourage pt to monitor pain and request assistance  - Assess pain using appropriate pain scale  - Administer analgesics based on type and severity of pain and evaluate response  - Implement non-pharmacological measures as appropriate and evaluate response  - Consider cultural and social influences on pain and pain management  - Manage/alleviate anxiety  - Utilize distraction and/or relaxation techniques  - Monitor for opioid side effects  - Notify MD/LIP if interventions unsuccessful or patient reports new pain  - Anticipate increased pain with activity and pre-medicate as appropriate  12/31/2022 0341 by Ksenia Abebe RN  Outcome: Progressing  12/31/2022 0337 by Ksenia Abebe RN  Outcome: Progressing  12/31/2022 0337 by Ksenia Abebe RN  Outcome: Progressing     Problem: CARDIOVASCULAR - ADULT  Goal: Maintains optimal cardiac output and hemodynamic stability  Description: INTERVENTIONS:  - Monitor vital signs, rhythm, and trends  - Monitor for bleeding, hypotension and signs of decreased cardiac output  - Evaluate effectiveness of vasoactive medications to optimize hemodynamic stability  - Monitor arterial and/or venous puncture sites for bleeding and/or hematoma  - Assess quality of pulses, skin color and temperature  - Assess for signs of decreased coronary artery perfusion - ex.  Angina  - Evaluate fluid balance, assess for edema, trend weights  12/31/2022 0341 by Soheila Rose RN  Outcome: Progressing  12/31/2022 0337 by Soheila Rose RN  Outcome: Progressing  Goal: Absence of cardiac arrhythmias or at baseline  Description: INTERVENTIONS:  - Continuous cardiac monitoring, monitor vital signs, obtain 12 lead EKG if indicated  - Evaluate effectiveness of antiarrhythmic and heart rate control medications as ordered  - Initiate emergency measures for life threatening arrhythmias  - Monitor electrolytes and administer replacement therapy as ordered  12/31/2022 0341 by Soheila Rose RN  Outcome: Progressing  12/31/2022 [phone] by Soheila Rose RN  Outcome: Progressing

## 2023-01-01 VITALS
BODY MASS INDEX: 17.07 KG/M2 | HEART RATE: 113 BPM | RESPIRATION RATE: 18 BRPM | WEIGHT: 100 LBS | HEIGHT: 64 IN | DIASTOLIC BLOOD PRESSURE: 98 MMHG | SYSTOLIC BLOOD PRESSURE: 137 MMHG | TEMPERATURE: 98 F | OXYGEN SATURATION: 100 %

## 2023-01-01 LAB — POTASSIUM SERPL-SCNC: 4.5 MMOL/L (ref 3.5–5.1)

## 2023-01-01 PROCEDURE — 84132 ASSAY OF SERUM POTASSIUM: CPT | Performed by: HOSPITALIST

## 2023-01-01 PROCEDURE — 97165 OT EVAL LOW COMPLEX 30 MIN: CPT

## 2023-01-01 PROCEDURE — 97535 SELF CARE MNGMENT TRAINING: CPT

## 2023-01-01 PROCEDURE — 97530 THERAPEUTIC ACTIVITIES: CPT

## 2023-01-01 RX ORDER — CEPHALEXIN 500 MG/1
500 CAPSULE ORAL 3 TIMES DAILY
Qty: 9 CAPSULE | Refills: 0 | Status: SHIPPED | OUTPATIENT
Start: 2023-01-01 | End: 2023-01-04

## 2023-01-01 NOTE — CM/SW NOTE
01/01/23 0900   CM/SW Referral Data   Referral Source Physician   Reason for Referral Discharge planning   Informant EMR;Clinical Staff Member   Discharge Needs   Anticipated D/C needs Home health care     HOME SITUATION  Type of Home: House   Home Layout: Two level  Stairs to Enter : 2  Railing: Yes  Stairs to [employer]:  (flight of stairs to bedroom; scoots on bottom)     Lives With:  (mom, brother, dad)  Drives: No  Patient Owned Equipment: Wheelchair  Patient Regularly Uses: None     Prior Level of [place] per PT eval: Pt reports that she typically navigates her house by scooting on her buttocks. She ascends and descends stairs on her buttocks. She is able to rise from the floor onto the toilet independently but has help if needed. When she transfers out of bed in the morning she slides off onto the ground. She is typically independent with activities and buttock scooting, but has family or caregiver (M-F 9:30-3) always around to help. Caregiver assists with cooking, errands, exercise routine, craft activities. Able to ambulate short distances with assist from parents via forearms. Has a wc but only uses when leaving the house, able to propel but typically family member assists. Typically navigates the 2 steps to get in with assistance. NOAH noted PT recommendations for [facility]. [facility] referrals sent in [address] [place] [address], choice list will be provided to pt/pt's family once available. NOAH will continue to follow. TIM Siddiqi  Discharge Planner     Addendum: Notified by RN pt is medically cleared to discharge. No accepting [facility] agencies at this time, deadline extended. NOAH spoke with on- at [facility] who stated the office is closed today and they can check benefits tomorrow when the office is open. Updated RN. NOAH will f/u with pt/pt's family regarding HH.

## 2023-01-01 NOTE — PLAN OF CARE
Problem: Patient/Family Goals  Discharge planning in progress, home tomorrow if cleared by urology  Goal: Patient/Family Long Term Goal  Description: Patient's Long Term Goal: to go home    Interventions:  - IVF, abx, tele monitoring, monitor urine output. - See additional Care Plan goals for specific interventions  Outcome: Progressing  Goal: Patient/Family Short Term Goal  Description: Patient's Short Term Goal: feel better    Interventions:   - - IVF, abx, tele monitoring, monitor urine output.     - See additional Care Plan goals for specific interventions  Outcome: Progressing     Problem: PAIN - ADULT  Denies pain , post cystoscopy with ureteral stent  Goal: Verbalizes/displays adequate comfort level or patient's stated pain goal  Description: INTERVENTIONS:  - Encourage pt to monitor pain and request assistance  - Assess pain using appropriate pain scale  - Administer analgesics based on type and severity of pain and evaluate response  - Implement non-pharmacological measures as appropriate and evaluate response  - Consider cultural and social influences on pain and pain management  - Manage/alleviate anxiety  - Utilize distraction and/or relaxation techniques  - Monitor for opioid side effects  - Notify MD/LIP if interventions unsuccessful or patient reports new pain  - Anticipate increased pain with activity and pre-medicate as appropriate  Outcome: Progressing     Problem: CARDIOVASCULAR - ADULT  Afebrile, Telemetry monitoring in progress  's - 130's with activity  Denies dizziness, headache or lightheadedness  Goal: Maintains optimal cardiac output and hemodynamic stability  Description: INTERVENTIONS:  - Monitor vital signs, rhythm, and trends  - Monitor for bleeding, hypotension and signs of decreased cardiac output  - Evaluate effectiveness of vasoactive medications to optimize hemodynamic stability  - Monitor arterial and/or venous puncture sites for bleeding and/or hematoma  - Assess quality of pulses, skin color and temperature  - Assess for signs of decreased coronary artery perfusion - ex.  Angina  - Evaluate fluid balance, assess for edema, trend weights  Outcome: Progressing  Goal: Absence of cardiac arrhythmias or at baseline  stable  Description: INTERVENTIONS:  - Continuous cardiac monitoring, monitor vital signs, obtain 12 lead EKG if indicated  - Evaluate effectiveness of antiarrhythmic and heart rate control medications as ordered  - Initiate emergency measures for life threatening arrhythmias  - Monitor electrolytes and administer replacement therapy as ordered  Outcome: Progressing

## 2023-01-01 NOTE — PLAN OF CARE
Patient is aox3, vss, tachy, ra, lungs clear, ST, positive bowel sounds, chronic constipation: prune juice, miralax and senakot given. Refused to get into a chair this morning because she was comfortably watching a movie in bed. OT will see today. PT was seen yesterday. Parents at bedside. Plan: DC home today    Problem: Patient/Family Goals  Goal: Patient/Family Long Term Goal  Description: Patient's Long Term Goal: to go home    Interventions:  - IVF, abx, tele monitoring, monitor urine output. - See additional Care Plan goals for specific interventions  Outcome: Adequate for Discharge  Goal: Patient/Family Short Term Goal  Description: Patient's Short Term Goal: feel better    Interventions:   - - IVF, abx, tele monitoring, monitor urine output.     - See additional Care Plan goals for specific interventions  Outcome: Adequate for Discharge

## 2023-01-02 NOTE — CM/SW NOTE
SW was notified by [facility] that they are able to accept pt. SW requested agency to reach out to pt/family. AVS uploaded to [facility].      MILADY Lara, Cydney mission  Discharge [address] [facility].    [address] [place] [address], [address] [place] [address]  Phone: [phone]  Fax: [phone]

## 2023-01-03 ENCOUNTER — PATIENT OUTREACH (OUTPATIENT)
Dept: CASE MANAGEMENT | Age: 23
End: 2023-01-03

## 2023-01-03 NOTE — PROGRESS NOTES
VM received; pt mother, Gwynneth Patches requesting assistance w/scheduling apt (dc 01/01)    Dr Meagan Heart, Lithotripsy  [address] [place]  [address] [phone]  Follow up 2 weeks

## 2024-09-13 ENCOUNTER — HOSPITAL ENCOUNTER (EMERGENCY)
Age: 24
Discharge: HOME | End: 2024-09-13
Attending: EMERGENCY MEDICINE
Payer: COMMERCIAL

## 2024-09-13 VITALS
DIASTOLIC BLOOD PRESSURE: 88 MMHG | HEART RATE: 86 BPM | WEIGHT: 98 LBS | BODY MASS INDEX: 17 KG/M2 | OXYGEN SATURATION: 96 % | SYSTOLIC BLOOD PRESSURE: 153 MMHG | RESPIRATION RATE: 20 BRPM

## 2024-09-13 DIAGNOSIS — S01.81XA LACERATION OF FOREHEAD, INITIAL ENCOUNTER: Primary | ICD-10-CM

## 2024-09-13 PROCEDURE — 12011 RPR F/E/E/N/L/M 2.5 CM/<: CPT

## 2024-09-13 PROCEDURE — 99283 EMERGENCY DEPT VISIT LOW MDM: CPT

## 2024-09-13 RX ORDER — IBUPROFEN 100 MG/5ML
10 SUSPENSION, ORAL (FINAL DOSE FORM) ORAL EVERY 4 HOURS PRN
Status: DISCONTINUED | OUTPATIENT
Start: 2024-09-13 | End: 2024-09-13

## 2024-09-13 RX ORDER — BACLOFEN 10 MG/1
TABLET ORAL
COMMUNITY
Start: 2024-09-01

## 2024-09-13 RX ORDER — CLONAZEPAM 0.5 MG/1
TABLET ORAL
COMMUNITY
Start: 2024-07-12

## 2024-09-13 RX ORDER — TRAZODONE HYDROCHLORIDE 50 MG/1
50 TABLET, FILM COATED ORAL NIGHTLY
COMMUNITY
Start: 2024-04-09

## 2024-09-13 NOTE — ED PROVIDER NOTES
Patient Seen in: [facility] [place]      History     Chief Complaint   Patient presents with    Fall    Laceration/Abrasion     Stated Complaint: fall with laceration to forehead    Subjective:   HPI    This is a 24-year-old female presents with right forehead laceration status post fall with spinocerebellar ataxia wheelchair-bound, fell while sitting on toilet today.  She fell onto the ground and hit her head.  Mom heard her fall.  There was no loss of consciousness.  She had a right forehead laceration.  No extremity complaints.  She was brought here from home for further evaluation.  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 No pertinent surgical history.             Social History     Socioeconomic History    Marital status: Single   Tobacco Use    Smoking status: Never    Smokeless tobacco: Never   Vaping Use    Vaping status: Never Used   Substance and Sexual Activity    Alcohol use: No    Drug use: No    Sexual activity: Never     Birth control/protection: Pill              Review of Systems    Positive for stated Chief Complaint: Fall and Laceration/Abrasion    Other systems are as noted in HPI.  Constitutional and vital signs reviewed.      All other systems reviewed and negative except as noted above.    Physical Exam     ED Triage Vitals [09/13/24 0551]   BP (!) 156/97   Pulse 107   Resp 20   Temp    Temp src Temporal   SpO2 100 %   O2 Device None (Room air)       Current Vitals:   Vital Signs  BP: 153/88  Pulse: 86  Resp: 20  Temp src: Temporal    Oxygen Therapy  SpO2: 96 %  O2 Device: None (Room air)            Physical Exam    GENERAL: Awake, alert, oriented x 3.  At mental baseline per parents.  SKIN: Normal, warm, and dry.  HEENT: 1 cm horizontally oriented  laceration right forehead.  Pupils equally round and reactive to light.  No cervical spine tenderness.  Conjuctiva clear.  Oropharynx is clear and moist.   Lungs: Clear to auscultation bilaterally with no rales, no retractions, and no wheezing.  HEART:  Regular rate and rhythm. S1 and S2. No murmurs, no rubs or gallops.   ABDOMEN: Soft, nontender and nondistended. Normoactive bowel sounds. No rebound. No guarding.   EXTREMITIES: Warm with brisk capillary refill.   Neuro: Patient has atrophy of the lower extremities.  She is not ambulatory.  Spastic movement of upper and lower extremities.    ED Course   Labs Reviewed - No data to display                   MDM          This is a 24-year-old female presents with right forehead laceration status post fall with spinocerebellar ataxia wheelchair-bound, fell while sitting on toilet toda    Laceration was anesthetized with lidocaine locally.  The wound was cleansed and irrigated copiously.  There was no visible foreign body within the wound. The wound was closed using a simple closure with 5-0 nylon, 5 sutures were placed the quality of the closure was excellent    Patient tolerated procedure well.  Discussed wound care instructions with mom.  Suture removal 5 days.  Tylenol for pain.  Cool compresses topically.  Follow head injury instructions.  Return for any problems.  Patient discharged home in good condition.            Disposition and Plan     Clinical Impression:  1. Laceration of forehead, initial encounter         Disposition:  Discharge  9/13/2024  6:43 am    Follow-up:  your doctor    Follow up in 5 day(s)  For suture removal          Medications Prescribed:  Current Discharge Medication List

## 2024-09-13 NOTE — ED INITIAL ASSESSMENT (HPI)
Patient was sitting on the toilet this morning when she fell. Laceration to right forehead. Denies LOC. Patient per norm at this time.

## 2024-09-13 NOTE — DISCHARGE INSTRUCTIONS
05/25/2023  Regina Brown is a 62 y.o., female.      Pre-op Assessment    I have reviewed the Patient Summary Reports.     I have reviewed the Nursing Notes. I have reviewed the NPO Status.   I have reviewed the Medications.     Review of Systems  Anesthesia Hx:  No problems with previous Anesthesia    Social:  Smoker Marijuana use    Hematology/Oncology:  Hematology Normal   Oncology Normal     EENT/Dental:EENT/Dental Normal   Cardiovascular:   Hypertension    Pulmonary:  Pulmonary Normal    Renal/:  Renal/ Normal     Hepatic/GI:  Hepatic/GI Normal    Musculoskeletal:   Mass of finger of left hand   Endocrine:  Endocrine Normal    Dermatological:  Skin Normal    Psych:  Psychiatric Normal           Physical Exam  General: Well nourished, Cooperative, Alert and Oriented    Airway:  Mallampati: II   Mouth Opening: Normal  TM Distance: Normal  Neck ROM: Normal ROM    Dental:  Dentures        Anesthesia Plan  Type of Anesthesia, risks & benefits discussed:    Anesthesia Type: MAC  Intra-op Monitoring Plan: Standard ASA Monitors  Post Op Pain Control Plan: multimodal analgesia  Informed Consent: Informed consent signed with the Patient and all parties understand the risks and agree with anesthesia plan.  All questions answered.   ASA Score: 2    Ready For Surgery From Anesthesia Perspective.     .       Tylenol for pain  Cool compress topically 20 minutes every 2 hours while awake  Follow head injury instructions return for any problems, severe headache, vomiting  Suture removal 5 days by primary care or return to the emergency room  Wash wound twice a day with gentle soap and water.  Let air dry.  Apply bacitracin and Band-Aid.  Leave open to air at night.

## 2024-09-18 ENCOUNTER — HOSPITAL ENCOUNTER (OUTPATIENT)
Age: 24
Discharge: HOME | End: 2024-09-18
Payer: COMMERCIAL

## 2024-09-18 VITALS
BODY MASS INDEX: 16 KG/M2 | WEIGHT: 93 LBS | OXYGEN SATURATION: 100 % | SYSTOLIC BLOOD PRESSURE: 120 MMHG | RESPIRATION RATE: 20 BRPM | TEMPERATURE: 98 F | DIASTOLIC BLOOD PRESSURE: 63 MMHG | HEART RATE: 130 BPM

## 2024-09-18 DIAGNOSIS — Z48.02 ENCOUNTER FOR REMOVAL OF SUTURES: Primary | ICD-10-CM

## 2024-09-18 NOTE — ED PROVIDER NOTES
Patient Seen in: [facility]      History     Chief Complaint   Patient presents with    Sut Stap RingRemoval     Stated Complaint: suture removal    Subjective:   Patient presents to immediate care for scheduled suture removal.  Well approximated suture line noted, patient denies any complications, denies redness, denies drainage, denies pain.            Objective:   Past Medical History:    Ataxia    Concussion    ECZEMA      SCA-7 (spinocerebellar ataxia type 7) ([facility])    Stargardt's disease (juvenile macular degeneration)    Vasovagal syncope              History reviewed. No pertinent surgical history.             No pertinent social history.            Review of Systems   Constitutional: Negative.    Respiratory: Negative.     Cardiovascular: Negative.    Gastrointestinal: Negative.    Skin: Negative.    Neurological: Negative.        Positive for stated Chief Complaint: Sut Stap RingRemoval    Other systems are as noted in HPI.  Constitutional and vital signs reviewed.      All other systems reviewed and negative except as noted above.    Physical Exam     ED Triage Vitals [09/18/24 1239]   /63   Pulse (!) 130   Resp 20   Temp 98 °F (36.7 °C)   Temp src Temporal   SpO2 100 %   O2 Device None (Room air)       Current Vitals:   Vital Signs  BP: 120/63  Pulse: (!) 130  Resp: 20  Temp: 98 °F (36.7 °C)  Temp src: Temporal    Oxygen Therapy  SpO2: 100 %  O2 Device: None (Room air)            Physical Exam  Vitals and nursing note reviewed.   Constitutional:       General: She is not in acute distress.  HENT:      Head: Normocephalic.   Cardiovascular:      Rate and Rhythm: Normal rate.   Pulmonary:      Effort: Pulmonary effort is normal.   Musculoskeletal:         General: Normal range of motion.   Skin:     General: Skin is warm and dry.      Comments: Approximated suture line to the right forehead   Neurological:      General: No focal deficit present.      Mental Status: She is alert and  oriented to person, place, and time.             ED Course   Labs Reviewed - No data to display                   MDM      Medical Decision Making  Total sutures removed 5  Wound Appearance: clean  Post-removal: Antibiotic ointment applied  Patient tolerance: Patient tolerated the procedure well with no immediate complications.    Problems Addressed:  Encounter for removal of sutures: acute illness or injury        Disposition and Plan     Clinical Impression:  1. Encounter for removal of sutures         Disposition:  Discharge  9/18/2024 12:57 pm    Follow-up:  No follow-up provider specified.        Medications Prescribed:  Discharge Medication List as of 9/18/2024  1:00 PM

## 2024-09-18 NOTE — DISCHARGE INSTRUCTIONS
Return to ED for redness, swelling, discharge, or pain at the site.   There will be a scar for 6 months which will improve over time. To prevent permanent scar formation, apply sun screen 15 minutes prior to going outside. Also you can use mederma once daily.

## 2024-09-27 ENCOUNTER — HOSPITAL ENCOUNTER (EMERGENCY)
Age: 24
Discharge: HOME | End: 2024-09-27
Attending: EMERGENCY MEDICINE
Payer: COMMERCIAL

## 2024-09-27 VITALS
DIASTOLIC BLOOD PRESSURE: 95 MMHG | TEMPERATURE: 98 F | WEIGHT: 95 LBS | RESPIRATION RATE: 16 BRPM | HEIGHT: 64 IN | BODY MASS INDEX: 16.22 KG/M2 | HEART RATE: 97 BPM | SYSTOLIC BLOOD PRESSURE: 153 MMHG | OXYGEN SATURATION: 97 %

## 2024-09-27 DIAGNOSIS — E87.6 HYPOKALEMIA: ICD-10-CM

## 2024-09-27 DIAGNOSIS — R19.7 DIARRHEA, UNSPECIFIED TYPE: Primary | ICD-10-CM

## 2024-09-27 LAB
ALBUMIN SERPL-MCNC: 3.5 G/DL (ref 3.4–5)
ALBUMIN/GLOB SERPL: 0.8 {RATIO} (ref 1–2)
ALP LIVER SERPL-CCNC: 73 U/L
ALT SERPL-CCNC: 19 U/L
ANION GAP SERPL CALC-SCNC: 2 MMOL/L (ref 0–18)
AST SERPL-CCNC: 16 U/L (ref 15–37)
BASOPHILS # BLD AUTO: 0.02 X10(3) UL (ref 0–0.2)
BASOPHILS NFR BLD AUTO: 0.2 %
BILIRUB SERPL-MCNC: 0.2 MG/DL (ref 0.1–2)
BILIRUB UR QL STRIP.AUTO: NEGATIVE
BUN BLD-MCNC: 15 MG/DL (ref 9–23)
CALCIUM BLD-MCNC: 9.6 MG/DL (ref 8.5–10.1)
CHLORIDE SERPL-SCNC: 102 MMOL/L (ref 98–112)
CLARITY UR REFRACT.AUTO: CLEAR
CO2 SERPL-SCNC: 31 MMOL/L (ref 21–32)
COLOR UR AUTO: YELLOW
CREAT BLD-MCNC: 0.64 MG/DL
EGFRCR SERPLBLD CKD-EPI 2021: 126 ML/MIN/1.73M2 (ref 60–?)
EOSINOPHIL # BLD AUTO: 0.03 X10(3) UL (ref 0–0.7)
EOSINOPHIL NFR BLD AUTO: 0.3 %
ERYTHROCYTE [DISTWIDTH] IN BLOOD BY AUTOMATED COUNT: 12.1 %
GLOBULIN PLAS-MCNC: 4.6 G/DL (ref 2.8–4.4)
GLUCOSE BLD-MCNC: 122 MG/DL (ref 70–99)
GLUCOSE UR STRIP.AUTO-MCNC: 100 MG/DL
HCT VFR BLD AUTO: 41.8 %
HGB BLD-MCNC: 14.5 G/DL
IMM GRANULOCYTES # BLD AUTO: 0.04 X10(3) UL (ref 0–1)
IMM GRANULOCYTES NFR BLD: 0.4 %
KETONES UR STRIP.AUTO-MCNC: NEGATIVE MG/DL
LEUKOCYTE ESTERASE UR QL STRIP.AUTO: NEGATIVE
LIPASE SERPL-CCNC: 39 U/L (ref 12–53)
LYMPHOCYTES # BLD AUTO: 1.68 X10(3) UL (ref 1–4)
LYMPHOCYTES NFR BLD AUTO: 15.8 %
MCH RBC QN AUTO: 33.3 PG (ref 26–34)
MCHC RBC AUTO-ENTMCNC: 34.7 G/DL (ref 31–37)
MCV RBC AUTO: 95.9 FL
MONOCYTES # BLD AUTO: 1.02 X10(3) UL (ref 0.1–1)
MONOCYTES NFR BLD AUTO: 9.6 %
NEUTROPHILS # BLD AUTO: 7.84 X10 (3) UL (ref 1.5–7.7)
NEUTROPHILS # BLD AUTO: 7.84 X10(3) UL (ref 1.5–7.7)
NEUTROPHILS NFR BLD AUTO: 73.7 %
NITRITE UR QL STRIP.AUTO: NEGATIVE
OSMOLALITY SERPL CALC.SUM OF ELEC: 282 MOSM/KG (ref 275–295)
PH UR STRIP.AUTO: 5.5 [PH] (ref 5–8)
PLATELET # BLD AUTO: 344 10(3)UL (ref 150–450)
POTASSIUM SERPL-SCNC: 3.2 MMOL/L (ref 3.5–5.1)
PROT SERPL-MCNC: 8.1 G/DL (ref 6.4–8.2)
RBC # BLD AUTO: 4.36 X10(6)UL
RBC UR QL AUTO: NEGATIVE
SODIUM SERPL-SCNC: 135 MMOL/L (ref 136–145)
SP GR UR STRIP.AUTO: >=1.03 (ref 1–1.03)
UROBILINOGEN UR STRIP.AUTO-MCNC: 0.2 MG/DL
WBC # BLD AUTO: 10.6 X10(3) UL (ref 4–11)

## 2024-09-27 PROCEDURE — 99284 EMERGENCY DEPT VISIT MOD MDM: CPT

## 2024-09-27 PROCEDURE — 83690 ASSAY OF LIPASE: CPT | Performed by: EMERGENCY MEDICINE

## 2024-09-27 PROCEDURE — 81003 URINALYSIS AUTO W/O SCOPE: CPT | Performed by: EMERGENCY MEDICINE

## 2024-09-27 PROCEDURE — 99285 EMERGENCY DEPT VISIT HI MDM: CPT

## 2024-09-27 PROCEDURE — 85025 COMPLETE CBC W/AUTO DIFF WBC: CPT | Performed by: EMERGENCY MEDICINE

## 2024-09-27 PROCEDURE — 80053 COMPREHEN METABOLIC PANEL: CPT | Performed by: EMERGENCY MEDICINE

## 2024-09-27 PROCEDURE — 96360 HYDRATION IV INFUSION INIT: CPT

## 2024-09-27 RX ORDER — POTASSIUM CHLORIDE 1.5 G/1.58G
40 POWDER, FOR SOLUTION ORAL ONCE
Status: COMPLETED | OUTPATIENT
Start: 2024-09-27 | End: 2024-09-27

## 2024-09-27 NOTE — ED INITIAL ASSESSMENT (HPI)
Pt with hx of spinal cerebellar ataxia, with caretaker at home. Caretaker states pt has had loose stools throughout the day which they believe seems like c-diff. Was taking abx for UTI, EOT about 2 weeks ago.

## 2024-09-28 NOTE — DISCHARGE INSTRUCTIONS
You were seen in the emergency department for diarrhea.      - Her potassium was slightly low, likely from the diarrhea. Try to eat bananas and stay hydrated with plenty of fluids.   - If you collect a stool sample, please send this for further testing.   - If you develop signs of severe dehydration, fevers, abdominal pain, black or bloody stool, or other concerns or worsening symptoms. Please follow-up closely with your primary care physician.

## 2024-09-28 NOTE — ED QUICK NOTES
Pt had an episode of incontinence after straight cath. Cleaned of incontinence and linens changed.

## 2024-09-28 NOTE — ED QUICK NOTES
Unable to provide stool sample .Instructed mother that if PT continues to have loose stools. Collect the sample and turn it in to the IC where the outpatient test was originally ordered.

## 2024-09-28 NOTE — ED PROVIDER NOTES
Patient Seen in: [facility] [place]      History     Chief Complaint   Patient presents with    Nausea/Vomiting/Diarrhea     Per mom, pt sent from urgent care for C DIFF r/o, endorses diarrhea since 0500 today.      Stated Complaint: C diff r/o    Subjective:   HPI  Patient is a 23 yo F with history of spinocerebellar ataxia, wheelchair bound at baseline who presents to ED for evaluation of diarrhea today. Patient is accompanied by her parents who assist with history. Parents note patient was treated for UTI with 10 day course of antibiotics about 2 weeks ago. They are unsure which antibiotics patient was taking. She was urinating frequently this week but was not c/o discomfort with urination. Today pt had large BM and then about 10 episodes of NBNB diarrhea which was foul smelling. No vomiting or fevers. No known sick contacts. No history of c. Diff. Pt went to  prior to this and was provided with stool sample collection cups. She was sent to ED for further testing due to concern for dehydration.     Objective:   Past Medical History:    Ataxia    Concussion    ECZEMA      SCA-7 (spinocerebellar ataxia type 7) ([facility])    Stargardt's disease (juvenile macular degeneration)    Vasovagal syncope              History reviewed. No pertinent surgical history.             Social History     Socioeconomic History    Marital status: Single   Tobacco Use    Smoking status: Never    Smokeless tobacco: Never   Vaping Use    Vaping status: Never Used   Substance and Sexual Activity    Alcohol use: No    Drug use: No    Sexual activity: Never     Birth control/protection: Pill              Review of Systems    Positive for stated Chief Complaint: Nausea/Vomiting/Diarrhea (Per mom, pt sent from urgent care for C DIFF r/o, endorses diarrhea since 0500 today. )    Other systems are as noted in HPI.  Constitutional and vital signs reviewed.      All other systems reviewed and negative except as noted  above.    Physical Exam     ED Triage Vitals [09/27/24 1716]   /81   Pulse (!) 137   Resp 20   Temp 98.1 °F (36.7 °C)   Temp src    SpO2 100 %   O2 Device None (Room air)       Current Vitals:   Vital Signs  BP: (!) 153/95  Pulse: 97  Resp: 16  Temp: 98.1 °F (36.7 °C)    Oxygen Therapy  SpO2: 97 %  O2 Device: None (Room air)            Physical Exam  Vitals and nursing note reviewed.   Constitutional:       General: She is not in acute distress.     Appearance: She is not ill-appearing.   HENT:      Head: Normocephalic and atraumatic.      Mouth/Throat:      Comments: dry  Eyes:      Extraocular Movements: Extraocular movements intact.      Pupils: Pupils are equal, round, and reactive to light.   Cardiovascular:      Rate and Rhythm: Normal rate and regular rhythm.   Pulmonary:      Effort: Pulmonary effort is normal.   Abdominal:      General: There is no distension.      Palpations: Abdomen is soft.      Comments: Mild LLQ TTP  No distension, no peritoneal signs   Musculoskeletal:      Cervical back: Neck supple.      Right lower leg: No edema.      Left lower leg: No edema.   Skin:     General: Skin is warm and dry.      Capillary Refill: Capillary refill takes less than 2 seconds.   Neurological:      Mental Status: She is alert.      Comments: At baseline   Psychiatric:         Mood and Affect: Mood normal.           ED Course     Labs Reviewed   CBC WITH DIFFERENTIAL WITH PLATELET - Abnormal; Notable for the following components:       Result Value    Neutrophil Absolute Prelim 7.84 (*)     Neutrophil Absolute 7.84 (*)     Monocyte Absolute 1.02 (*)     All other components within normal limits   COMP METABOLIC PANEL (14) - Abnormal; Notable for the following components:    Glucose 122 (*)     Sodium 135 (*)     Potassium 3.2 (*)     Globulin  4.6 (*)     A/G Ratio 0.8 (*)     All other components within normal limits   URINALYSIS, ROUTINE - Abnormal; Notable for the following components:    Glucose  Urine 100 (*)     Protein Urine Trace (*)     All other components within normal limits   LIPASE - Normal   C. DIFFICILE(TOXIGENIC)PCR   GI STOOL PANEL BY PCR                 [facility]      Patient is a 25 yo F with history of spinocerebellar ataxia, wheelchair bound at baseline who presents to ED for evaluation of diarrhea today after recent antibiotic use 2 weeks ago for UTI. Pt is afebrile, mildly tachycardic but satting well on RA. On exam patient is nontoxic appearing but does appear dehydrated. She has mild LLQ TTP but no peritoneal signs. No distension. Differential includes but not limited to viral or foodborne illness, c. Diff. Will obtain labs, give IVF and collect stool sample. Will hold off on abdominal imaging at this time given no significant abd TTP, no fevers or other concerning symptoms.     CBC shows no leukocytosis, normal hgb. CMP with sodium of 135, potassium 3.2 which repleted, otherwise unremarkable. Lipase wnl. UA negative for UTI.         ED Course as of 09/28/24 0012  ------------------------------------------------------------  Time: 09/27 2240  Comment: Patient reevaluated multiple times while in the ED.  No bowel movement in ED to send for stool culture/c diff testing over several hours, which is reassuring that this could be less likely C. Diff as diarrhea seems to be resolving. Patient has a stool sample kit with her from [employer] that parents can send to the for testing if patient has a bowel movemen at home.  At this time patient is hungry and tolerating p.o.  Her heart rate improved. Parents feel comfortable with plan for discharge home.  I discussed lab results.  UA negative for UTI.  Provided with strict return precautions and other supportive care measures.  Recommended close follow-up with patient's primary care physician.  Parents and patient verbalized understanding agreement                                Medical Decision Making      Disposition and Plan     Clinical Impression:  1.  Diarrhea, unspecified type    2. Hypokalemia         Disposition:  Discharge  9/27/2024 10:40 pm    Follow-up:  Kelly Castle MD  [address] [id number] [phone]    Schedule an appointment as soon as possible for a visit in 1 day(s)            Medications Prescribed:  Discharge Medication List as of 9/27/2024 10:45 PM

## 2024-11-12 ENCOUNTER — OFFICE VISIT (OUTPATIENT)
Dept: INTERNAL MEDICINE CLINIC | Facility: CLINIC | Age: 24
End: 2024-11-12
Payer: COMMERCIAL

## 2024-11-12 DIAGNOSIS — G11.8: Primary | ICD-10-CM

## 2024-11-12 DIAGNOSIS — G47.00 INSOMNIA, UNSPECIFIED TYPE: ICD-10-CM

## 2024-11-12 RX ORDER — HYDROXYZINE HYDROCHLORIDE 25 MG/1
TABLET, FILM COATED ORAL
Qty: 30 TABLET | Refills: 0 | Status: SHIPPED | OUTPATIENT
Start: 2024-11-12

## 2024-11-12 RX ORDER — NORETHINDRONE ACETATE AND ETHINYL ESTRADIOL 1; 20 MG/1; UG/1
TABLET ORAL
Qty: 84 TABLET | Refills: 1 | Status: SHIPPED | OUTPATIENT
Start: 2024-11-12

## 2024-11-12 NOTE — PROGRESS NOTES
Subjective:   Patient ID: Sondra Garcia is a 24 year old female.    HPI Needs refills. Is not sleeping well. Has tried Clonazepam, trazodone, valium and none have worked per mom.   Sees Neuro.     History/Other:   Past Medical History:    Ataxia    Concussion    ECZEMA      SCA-7 (spinocerebellar ataxia type 7) ([facility])    Stargardt's disease (juvenile macular degeneration)    Vasovagal syncope       Review of Systems   Constitutional:  Negative for activity change and appetite change.   Psychiatric/Behavioral:  Positive for sleep disturbance. Negative for dysphoric mood.      Current Outpatient Medications   Medication Sig Dispense Refill    baclofen 10 MG Oral Tab       traZODone 50 MG Oral Tab Take 1 tablet (50 mg total) by mouth nightly.      clonazePAM 0.5 MG Oral Tab Stat 0.5mg po qhs. Can gradually go up to 1mg or even 1.5mg po qhs if needed and tolerated.      Solifenacin Succinate 10 MG Oral Tab Take 1 tablet (10 mg total) by mouth daily. 90 tablet 3    JUNEL 1/20 1-20 MG-MCG Oral Tab 1 PO QD, take continuously skipping withdraw week 4 Package 4     Allergies:Allergies[1]    Objective:   Physical Exam  Constitutional:       Appearance: She is well-developed.   HENT:      Head: Normocephalic and atraumatic.   Pulmonary:      Effort: Pulmonary effort is normal.   Psychiatric:         Mood and Affect: Mood normal.         Behavior: Behavior normal.         Assessment & Plan:   1. SCA-7 (spinocerebellar ataxia type 7) ([facility])    2. Insomnia, unspecified type    F/u Neuro. Handicap parking placard form completed.   Hydroxyzine. Discussed risks/benefits/potential side effects and proper use of medication.     No orders of the defined types were placed in this encounter.      Meds This Visit:  Requested Prescriptions      No prescriptions requested or ordered in this encounter       Imaging & Referrals:  None         [1] No Known Allergies

## 2024-12-02 RX ORDER — SOLIFENACIN SUCCINATE 10 MG/1
10 TABLET, FILM COATED ORAL DAILY
Qty: 90 TABLET | Refills: 3 | OUTPATIENT
Start: 2024-12-02

## 2024-12-13 ENCOUNTER — OFFICE VISIT (OUTPATIENT)
Dept: INTERNAL MEDICINE CLINIC | Facility: CLINIC | Age: 24
End: 2024-12-13
Payer: COMMERCIAL

## 2024-12-13 VITALS — TEMPERATURE: 98 F | RESPIRATION RATE: 16 BRPM | HEART RATE: 73 BPM | OXYGEN SATURATION: 98 %

## 2024-12-13 DIAGNOSIS — G11.8: Primary | ICD-10-CM

## 2024-12-13 DIAGNOSIS — G47.00 INSOMNIA, UNSPECIFIED TYPE: ICD-10-CM

## 2024-12-13 PROCEDURE — 99214 OFFICE O/P EST MOD 30 MIN: CPT | Performed by: FAMILY MEDICINE

## 2024-12-13 RX ORDER — TRAZODONE HYDROCHLORIDE 100 MG/1
TABLET ORAL
Qty: 30 TABLET | Refills: 0 | Status: SHIPPED | OUTPATIENT
Start: 2024-12-13

## 2024-12-13 RX ORDER — NORETHINDRONE ACETATE AND ETHINYL ESTRADIOL 1; 20 MG/1; UG/1
TABLET ORAL
Qty: 84 TABLET | Refills: 1 | Status: SHIPPED | OUTPATIENT
Start: 2024-12-13

## 2024-12-13 RX ORDER — SOLIFENACIN SUCCINATE 10 MG/1
10 TABLET, FILM COATED ORAL DAILY
Qty: 90 TABLET | Refills: 3 | Status: SHIPPED | OUTPATIENT
Start: 2024-12-13

## 2024-12-13 RX ORDER — BACLOFEN 10 MG/1
10 TABLET ORAL 3 TIMES DAILY
Qty: 270 TABLET | Refills: 1 | Status: SHIPPED | OUTPATIENT
Start: 2024-12-13 | End: 2025-03-13

## 2024-12-15 NOTE — PROGRESS NOTES
Subjective:   Patient ID: Sondra Garcia is a 24 year old female.    HPI Here for f/u. Patient needs refills of her medication. Taking as prescribed.   Has ongoing insomnia that is not relieved with clonazepam, hydroxyzine, diazepam, trazodone. Patient's mom is going to get ok from patient's Neurologist to try Ambien.     History/Other:   Review of Systems   Constitutional:  Negative for chills and fever.   Respiratory:  Negative for chest tightness and shortness of breath.    Cardiovascular:  Negative for chest pain and palpitations.     Current Outpatient Medications   Medication Sig Dispense Refill    traZODone 100 MG Oral Tab 1-2 tabs PO at bedtime PRN 30 tablet 0    baclofen 10 MG Oral Tab Take 1 tablet (10 mg total) by mouth 3 (three) times daily. 270 tablet 1    Solifenacin Succinate 10 MG Oral Tab Take 1 tablet (10 mg total) by mouth daily. 90 tablet 3    JUNEL 1/20 1-20 MG-MCG Oral Tab 1 PO QD, take continuously skipping withdraw week  tablet 1    hydrOXYzine 25 MG Oral Tab 1-3 tabs PO at bedtime PRN 30 tablet 0     Allergies:Allergies[1]    Objective:   Physical Exam  Vitals reviewed.   Constitutional:       Appearance: Normal appearance. She is well-developed.   HENT:      Head: Normocephalic and atraumatic.   Pulmonary:      Effort: Pulmonary effort is normal.   Neurological:      Mental Status: She is alert.   Psychiatric:         Mood and Affect: Mood normal.         Behavior: Behavior normal.         Assessment & Plan:   1. SCA-7 (spinocerebellar ataxia type 7) ([facility])    2. Insomnia, unspecified type    Meds refilled. Patient's mom to message if Ambien ok'd by Neuro. Until then will continue Trazodone.     No orders of the defined types were placed in this encounter.      Meds This Visit:  Requested Prescriptions     Signed Prescriptions Disp Refills    traZODone 100 MG Oral Tab 30 tablet 0     Si-2 tabs PO at bedtime PRN    baclofen 10 MG Oral Tab 270 tablet 1     Sig: Take 1 tablet (10 mg  total) by mouth 3 (three) times daily.    Solifenacin Succinate 10 MG Oral Tab 90 tablet 3     Sig: Take 1 tablet (10 mg total) by mouth daily.    JUNEL  1-20 MG-MCG Oral Tab 84 tablet 1     Si PO QD, take continuously skipping withdraw week       Imaging & Referrals:  None         [1] No Known Allergies

## 2024-12-27 NOTE — TELEPHONE ENCOUNTER
Sondra Garcia requesting Medication Refill for: a 90 DAY supply    Medication name and dose (copy and paste from medication list)    Medication Quantity Refills Start End   traZODone 100 MG Oral Tab 30 tablet 0 2024 --   Si-2 tabs PO at bedtime PRN     Route:   (none)     Order #:   [id number]         If medication is not on medication list - transfer patient to RN queue for triage    Preferred Pharmacy:     [place] [address] [place] [facility] [address] [place] [address] [phone], [phone]   [address] [address] [address]   Phone: [phone] Fax: [phone]   Hours: Not open 24 hours       LOV: 2024   Last Refill date: 24  Next Scheduled appointment: No future appointments.

## 2025-01-02 RX ORDER — TRAZODONE HYDROCHLORIDE 100 MG/1
TABLET ORAL
Qty: 90 TABLET | Refills: 0 | Status: SHIPPED | OUTPATIENT
Start: 2025-01-02

## 2025-01-02 NOTE — TELEPHONE ENCOUNTER
Protocol Failed/ No Protocol    Requested Prescriptions   Pending Prescriptions Disp Refills    traZODone 100 MG Oral Tab 90 tablet 0     Si-2 tabs PO at bedtime PRN       There is no refill protocol information for this order            Recent Outpatient Visits              2 weeks ago SCA-7 (spinocerebellar ataxia type 7) ([facility])    [facility], [address] [place] [address], DeerwSeetie Avendano,     Office Visit    1 month ago SCA-7 (spinocerebellar ataxia type 7) ([facility])    [facility], [address] [place] [address], Sweetie Carrillo,     Office Visit    2 years ago Adjustment disorder, unspecified type    [facility], Kamila Evans PsyD    Office Visit    2 years ago Adjustment disorder, unspecified type    [facility], Kamila Evans PsyD    Office Visit    2 years ago Adjustment disorder, unspecified type    [facility] - [facility], Kamila Evans, [facility]    Office Visit

## 2025-01-06 ENCOUNTER — TELEPHONE (OUTPATIENT)
Dept: INTERNAL MEDICINE CLINIC | Facility: CLINIC | Age: 25
End: 2025-01-06

## 2025-01-06 NOTE — TELEPHONE ENCOUNTER
Please call 's office. I am not going to sign the death certificate as I had only seen her twice. She had a Neurologist she had been seeing for some time Dr. Dominic Felipe, through [facility] that could sign it.

## 2025-01-06 NOTE — TELEPHONE ENCOUNTER
Called coroners office, spoke with Roby. Notified Roby of message below from Dr. Anguiano. Provided name for pt's neurologist and advised to reach out to them for signature of death certificate. Roby verbalizes understanding and is appreciative of call back.

## 2025-01-06 NOTE — TELEPHONE ENCOUNTER
Patient passed away on 25. Needs MD signature on the death certificate as soon as possible. Calling to check on status as the  home is contacting him about it.

## (undated) DEVICE — SLEEVE KENDALL SCD EXPRESS MED

## (undated) DEVICE — Device

## (undated) DEVICE — URETERAL STENT
Type: IMPLANTABLE DEVICE | Site: URETER | Status: NON-FUNCTIONAL
Brand: ASCERTA™
Removed: 2022-12-29

## (undated) DEVICE — CYSTO CDS-LF: Brand: MEDLINE INDUSTRIES, INC.

## (undated) DEVICE — STERILE POLYISOPRENE POWDER-FREE SURGICAL GLOVES: Brand: PROTEXIS

## (undated) NOTE — LETTER
Patient Name: Se Ferguson  YOB: 2000          MRN :  [id number]  Date:  4/10/2021  Referring Physician:  Ying Baker    Progress Summary  Pt has attended 20 visits in Physical Therapy.      Dx: [facility] at[facility]ia type [facility] functional gains. She has improved her activity tolerance noted by ability to spend majority of session standing with only 1-2 brief seated rest breaks needed.  She is more limited in the amount she is able to accomplish in session by duration of time neede Manual Therapy, Neuromuscular Re-education, Self-Care Home Management, Therapeutic Activities, Therapeutic Exercise and Home Exercise Program instruction             Patient/Family/Caregiver was advised of these findings, precautions, and treatment options

## (undated) NOTE — LETTER
Patient Name: Forrest Denver  YOB: 2000          MRN number:  [id number]  Date:  10/15/2019  Referring Physician:  Helio Lyles    Progress Summary  Pt has attended 20 visits in Physical Therapy.      Dx: Ataxia       Insurance (Authorized # o required GUS UE support, Mod assist x1, able to perform with weighted walker Juan, but with further dec speed)  Fall Risk: yes      Assessment: Patient making slow, but gradual progress with skilled PT intervention as expected for status and diagnosis.  OSCAR/ Tamir Keen Therapeutic Activities, Therapeutic Exercise, Home Exercise Program instruction and Modalities to include: as indicated.             Patient/Family/Caregiver was advised of these findings, precautions, and treatment options and has agreed to actively parti

## (undated) NOTE — LETTER
Patient Name: Dulce Pack  YOB: 2000          MRN :  [id number]  Date:  1/9/2021  Referring Physician:  Carolyne Hearing    Progress Summary  Pt has attended 10 visits in Physical Therapy.      Dx: Spinocerebellar ataxia type 7 ([facility] Assessment: Patient making slow, but gradual progress since [facility] as expected for diagnosis. Though she has not made significant MMT strength gains, this is not immediately expected at this time, is more appropriate to look at functional gains.  She has been Plan: Continue skilled Physical Therapy 1 x/week or a total of 10 visits over a 90 day period (20 total POC).  Treatment will include: Gait training, Manual Therapy, Neuromuscular Re-education, Self-Care Home Management, Therapeutic Activities, Therapeutic

## (undated) NOTE — LETTER
Patient Name: Arie Ellsworth  YOB: 2000          MRN :  [id number]  Date:  5/1/2021  Referring Physician:  Rodolfo Millan    Discharge Summary  Pt has attended 23 visits in Physical Therapy.      Dx: [facility]rebellar at[facility]ia type [facility] not further assess TUG at this time      Assessment: Discussed POC with patient, she feels ready and willing to D/C to home with IND home program with help from her family. Discussed home options for continued increased mobility.  She was wondering about ho compliant in HEP to maintain progress made in PT. - MET per patient report      Plan: Patient to D/C from PT with continued IND HEP.         Patient/Family/Caregiver was advised of these findings, precautions, and treatment options and has agreed to [employer]

## (undated) NOTE — LETTER
Patient Name: Se Ferguson  YOB: 2000          MRN number:  [id number]  Date:  11/12/2019  Referring Physician:  Estella Greco    Discharge Summary  Pt has attended 21 visits in Physical Therapy.      Dx: Ataxia       Insurance (Authorized # kicking the walker; not yet appropriate for IND use due to level of therapist assist for safe use. (8/7/19) - decreased to 20# walker with improved control with turns (8/12/19) - maintained status (10/15/19)  Timed Up and Go (AD, time): 35  sec with min as without LOB and upright posture, in order to increase safety during ADLs and improve LE muscular endurance.  - met initial goal, progress to performance romberg stance 30 sec - MET progressed goal         Plan: Patient to D/C from PT with continued independ

## (undated) NOTE — ED AVS SNAPSHOT
Wilder Merlin   MRN: [id number]    Department:  THE [facility] in [place]   Date of Visit:  8/30/2018           Disclosure     Insurance plans vary and the physician(s) referred by the ER may not be covered by your plan.  Please contact tell this physician (or your personal doctor if your instructions are to return to your personal doctor) about any new or lasting problems. The primary care or specialist physician will see patients referred from the [facility] Emergency Department.  Cecil Tineo

## (undated) NOTE — LETTER
Patient Name: Fabiano Colmenares  YOB: 2000          MRN number:  [id number]  Date:  11/7/2020  Referring Physician:  Ines Ramirez         NEUROLOGICAL EVALUATION:    Referring Physician: Dr. Sandra Strickland  Diagnosis: Spinocerebellar ataxia type 7 ( Pt describes pain level as none. Denies pain effecting her in any significant way, however, when moving on the mat table and attempting SL or hooklying hip abd motion, she reported low back discomfort.  Reports intermittent tingling in her LEs, usually last Thomas Holloway is a 21year old female who presents for skilled physical therapy services on 11/2/2020 with primary c/o declining mobility, imbalance, and difficulty walking due to diagnosis of SCA type 7.  The results of the objective tests and measures show slow Flexion WFL 4 GUS     Extension WFL 3+ GUS     Abduction WFL 3+ GUS     External Rot WFL 3+ GUS     Internal Rot WFL 4 GUS     Hip AROM MMT (-/5)     Flexion WFL R 4; L 4-     Extension A limited due to strength, P WFL 3- GUS     Abduction A limited due Charges: PT Eval High Complexity, Therex x 1      Total Timed Treatment: 10 min     Total Treatment Time: 55 min     Based on clinical rationale and outcome measures, this evaluation involved High Complexity decision making due to 3+ personal factors/comor I certify the need for these services furnished under this plan of treatment and while under my care.     X___________________________________________________ Date____________________    Certification From: 82/0/7792  To:1/31/2021

## (undated) NOTE — LETTER
Patient Name: Forrest Denver  YOB: 2000          MRN number:  [id number]  Date:  6/18/2019  Referring Physician:  Sanjana Ruby        NEUROLOGICAL EVALUATION:    Referring Physician: Dr. Sanjana Ruby  Diagnosis: Ataxia  Date of Serv negotiate about 15 stairs in her house, but relies heavily on the rail for UE support.  Pt's mom believes that stiffness contributes to her loss of mobility, However, both the pt and her mother agree that they have seen a marked decrease in spasticity and t postural control.   Functional deficits include, but are not limited to inability to perform consecutive transfers independently due to fatigue, inability to walk independently without UE support, increased risk of falls during transfers and ambulation, and Mobility / Transfers Level of Assistance **with weighted vest   Bed Mobility Independent   Supine --> Sit Pt was able to independently transfer with poor postural control and UE assist. ** Pt noted increased dizziness upon sitting upright.     Sit --> Supin for safety. Charges: PT Eval Moderate Complexity, Pt presents with a progressive condition that affects multiple body systems including vision, cognition, neuromuscular, and cardiovascular.  Pt currently requires general conditioning, as well as trainin Rehab Potential:good    FOTO: 41/100    This treatment was provided by BUD Christian under the direct and constant direction and supervision of a licensed therapist, who provided consultation regarding skilled judgements, treatment, and assessment of

## (undated) NOTE — LETTER
Patient Name: Fabiano Colmenares  YOB: 2000          MRN number:  [id number]  Date:  8/5/2019  Referring Physician:  Mehnaz Whalen    Progress Summary  Dx: ataxia       Insurance (Authorized # of Visits):  60 visit limit yearly            Jacquelyn Drew Timed Up and Go (AD, time):  39  sec with mod assist x1  (No AD, required GUS UE support, Mod assist x1, able to perform with weighted walker Juan, but with further dec speed)  Fall Risk: yes      Assessment: Patient making slow, but gradual progress with over a 90 day period.  Treatment will include: Gait training, Manual Therapy, Mechanical Traction, Neuromuscular Re-education, Self-Care Home Management, Therapeutic Activities, Therapeutic Exercise, Home Exercise Program instruction and Modalities to inclu

## (undated) NOTE — ED AVS SNAPSHOT
Luz Maria Richard   MRN: [id number]    Department:  [facility] Emergency Department in [place]   Date of Visit:  12/10/2017           Disclosure     Insurance plans vary and the physician(s) referred by the ER may not be covered by your plan.  Please contac tell this physician (or your personal doctor if your instructions are to return to your personal doctor) about any new or lasting problems. The primary care or specialist physician will see patients referred from the [facility] Emergency Department.  Wing Sanchez